# Patient Record
(demographics unavailable — no encounter records)

---

## 2017-02-16 NOTE — CONSULTATION
DATE OF CONSULTATION:  02/16/2017



CARDIOLOGY CONSULTATION



CONSULTING PHYSICIAN:  Steve Colon M.D.



REFERRING PHYSICIAN:  Dc Chen M.D.



REASON FOR CONSULTATION:  Management of chest pain.



HISTORY OF PRESENT ILLNESS:  The patient is a very pleasant

56-year-old female, who presents to the hospital with complaints of

dizziness and left precordial chest pain.  Apparently, the patient was at

home when she started to have an acute onset of chest pain described as

pressure-like, nonradiating, _____, and the intensity of 7/10 with

associated mild shortness of breath and mild nausea.  She called 911 and

was brought to the emergency department where the blood pressure was

131/76 and heart rate was 112.  She denies any prior history of coronary

artery disease, congestive heart failure, or cardiac arrhythmias.

However, she has risk factors of diabetes mellitus and hypertension from

coronary artery disease standpoint.  She denies any change in exercise

tolerance in the past two weeks.



PAST MEDICAL HISTORY:  Diabetes mellitus, hypertension, history of

diabetic neuropathy, history of left breast cancer, status post

lumpectomy, and chemotherapy.



PAST SURGICAL HISTORY:  Lumpectomy and hysterectomy.



MEDICATIONS:  List of medications at home, amlodipine 10 mg p.o.

daily, vitamin D3 2000 units one tablet daily, vitamin B12 1000 mcg p.o.

daily, folic acid 0.4 mg daily, Amaryl 4 mg p.o. twice daily,

hydrochlorothiazide 25 mg p.o. daily, metformin 850 mg p.o. twice daily,

topiramate 100 mg p.o. daily, triamterene hydrochlorothiazide 37.5/25 one

tablet daily, and _____ 25 mg p.o. daily.



SOCIAL HISTORY:  Denies any smoking at this time.  She has smoked in

the past, cessation is about a year.  Denies any alcohol or illicit drug

use.



FAMILY HISTORY:  Both mom and dad had coronary artery disease and

myocardial infarction in 50s.



REVIEW OF SYSTEMS:  HEENT:  Denies any headache, but she had

dizziness and lightheadedness.  Constitutional:  She is complaining of

generalized weakness.  No fever, chills, or night sweats.  Cardiovascular:

Chest pain as mentioned above.  Some mild shortness of breath.  Denies

any PND, orthopnea, leg swelling, palpitations, or syncope.  Pulmonary:

Denies any cough, hemoptysis, or wheezing.  Gastrointestinal:  She has

some nausea, but no diarrhea, constipation, abdominal pain, or GI bleed.

Genitourinary:  Denies any hematuria, dysuria, or incontinence.

Neurology:  Denies any motor dysfunction, sensory deficit, or altered

speech.



PHYSICAL EXAMINATION:

VITAL SIGNS:  Blood pressure was 152/62, pulse of 78, respirations

21, temperature 97.8 degrees Fahrenheit, and O2 saturation 99% on room

air.

GENERAL:  The patient is a very pleasant 56-year-old female in no

apparent respiratory distress.  Alert and oriented x4.

HEENT:  Atraumatic and normocephalic.  Anicteric.  Pupils are equal,

round, and reactive to light and accommodation.  Extraocular muscles

intact.

NECK:  JVP is less than 5 cm.  No carotid bruits.  Carotid upstrokes

2+ bilaterally.

CVS:  Normal S1 and S2.  Regular rate and rhythm.  No murmurs,

gallops, or rubs.  PMI is at fourth intercostal space at the midclavicular

line.

LUNGS:  Clear to auscultation bilaterally.

ABDOMEN:  Soft, nontender, and nondistended.  No hepatosplenomegaly.

Positive bowel sounds.

EXTREMITIES:  No evidence of edema, clubbing, or cyanosis.



LABORATORY AND DIAGNOSTIC FINDINGS:  WBC is 8.0, hemoglobin 13.2,

hematocrit of 42.1, and platelet count 221,000.  Sodium was 132, potassium

3.6, chloride was 91, carbon dioxide 18, BUN of 25, and creatinine 1.6,

glucose is 145, calcium is 10.0, troponin I x3 is negative.  Chest x-ray

showed no acute cardiopulmonary disease.  CT of head showed normal CT of

head without intracranial bleed or shift.  The 2D echocardiography shows

normal LV systolic function with LVEF of about 50% to 55% and grade 1 LV

diastolic dysfunction.  Normal right ventricular systolic pressure

measured at 11 mmHg.  A 12-lead electrocardiogram shows sinus tachycardia

at a rate of 114 with nonspecific ST and T-wave abnormalities.  No acute

ischemic changes.



ASSESSMENT AND PLAN:  The patient is a very pleasant 56-year-old

female, seen in Cardiology consultation at the request of Dr. Chen.



1. Chest pain, probably atypical.  The patient's echocardiography shows

no evidence of wall motion abnormalities.  A 12-lead electrocardiogram is

not revealing any criteria for ischemia.

Acute myocardial infarction is ruled out.  The patient do not require

any further cardiac workup at this point.

2. Renal failure.  I would consider placing a hold on

hydrochlorothiazide in view of hyponatremia, metformin also needs to be

withheld in view of high creatinine.

Nephrology consultation will also be required to adjust the patient's

medication dose and not quite sure whether the patient ______.  Hydration

will be attempted to see if there is any component of acute kidney

injury.

3. Diabetes mellitus.  I will consider aspirin and statin to her

regimen.  The patient might benefit from ACE inhibitors in a long run in

view of associated kidney disease.

4. History of hypertension.  Blood pressure is currently stage 1.  We

will continue amlodipine.  We may have to add ACE or ARB if the acute

kidney injury is ruled out.



I would like to thank, Dr. Chen, for the courtesy of this

consultation.









  ______________________________________________

  Steve Colon M.D.





DR:  ALONZO

D:  02/16/2017 19:18

T:  02/16/2017 22:14

JOB#:  [id number]

CC:

## 2017-02-16 NOTE — HISTORY AND PHYSICAL REPORT
DATE OF ADMISSION:  2017



CHIEF COMPLAINT:  The patient is a 56 year  female,

who presents with a chief complaint of chest pain.



HISTORY OF PRESENT ILLNESS:  Began yesterday, 02/15/2017 around 9

p.m.  The patient began to feel dizzy.  The patient felt ringing in her

ears.  The patient called 911.  The patient stated that her blood sugar

was fine.  EMS left her house.



The patient then began to experience chest pressure.  Chest pressure is

substernal.  It has been constant since last evening.  The patient then

presented to [facility].  The patient was admitted for chest

pain to rule out acute coronary syndrome.



PAST MEDICAL HISTORY:  Significant for,



1. Type 2 diabetes.

2. Hypertension.

3. History of left breast cancer, status post lumpectomy, chemotherapy,

and radiation therapy.

4. Chronic low back pain.



PAST SURGICAL HISTORY:  Significant for,



1. Total abdominal hysterectomy.

2. Left breast lumpectomy.



CURRENT MEDICATIONS:

1. Norvasc 10 mg one tablet p.o. daily.

2. Vitamin D 2000 units daily.

3. Vitamin B12 1000 mcg daily.

4. Folic acid 0.4 mg one tablet p.o. daily.

5. Glimepiride 4 mg one tablet p.o. twice daily.

6. Hydrochlorothiazide 25 mg one tablet p.o. daily.

7. Metformin 850 mg one tablet p.o. twice daily.

8. Topamax 100 mg one tablet p.o. daily.

9. Triamterene/hydrochlorothiazide 37.5/25 one tablet p.o. daily.

10. _____ of an unknown dose daily.



ALLERGIES:  To morphine.



SOCIAL HISTORY:  The patient is single and is disabled.  The patient

is  from her .  The patient denies tobacco or alcohol

use.



FAMILY HISTORY:  Significant for diabetes in the patient's mother and

father.  Significant for coronary artery disease in the patient's parents

and siblings, all of whom  in their 50s.



REVIEW OF SYSTEMS:  Constitutional:  The patient denies weight loss

or weight gain.  The patient denies fevers or chills.  HEENT:  The patient

denies ear or throat pain.  Cardiovascular:  The patient complains of

chest pain as above.  The patient denies palpitations.  Chest:  The

patient denies wheeze or shortness of breath.  Abdomen:  The patient

denies nausea, vomiting, diarrhea, or constipation.  Genitourinary:  The

patient denies dysuria or increased frequency of urination.

Neuromuscular:  The patient denies seizures or generalized weakness.



PHYSICAL EXAM:

VITAL SIGNS:  Temperature 97.5 degrees, respirations 20, pulse 91 to

93, blood pressure 130/90.

GENERAL:  The patient is well-developed and well-nourished obese

 female, in no apparent distress.

HEENT:  Eyes, pupils are equal and responsive to light and

accommodation.  Extraocular movements are intact.

NECK:  Supple without lymphadenopathy.

CHEST:  Lungs are clear to auscultation bilaterally without wheezes

or rales.

CARDIOVASCULAR:  Regular rhythm and rate.  S1 and S2 are normal

without murmurs, rubs, or gallops.

ABDOMEN:  Soft, nontender, and nondistended.  Positive bowel sounds.

No evidence of hepatosplenomegaly.  Currently, no rebound or guarding

noted.

EXTREMITIES:  Negative for clubbing, cyanosis, or edema.

RECTAL:  Refused.

GENITAL:  Refused.

NEUROLOGICAL:  Cranial nerves II through XII are grossly intact

without focal deficits.  Motor strength is 5/5 bilaterally.  Deep tendon

reflexes are 2+ plantar.



LABORATORY AND DIAGNOSTIC STUDIES:  WBC 8.2, hemoglobin 15.2,

hematocrit 42.1, and platelets 221,000.  Sodium 133, potassium 3.6,

chloride 91, CO2 18, BUN 25, creatinine 1.6, and glucose 145.  Troponin is

less than 0.3.  EKG demonstrated normal sinus rhythm with nonspecific ST

changes.  Otherwise, no Q-waves noted.



ASSESSMENT:  This is a 56-year-old  female.



1. Chest pain.

2. Diabetes type 2.

3. Hypertension.

4. History of left breast cancer.

5. Chronic low back pain.



TREATMENT:

1. Chest pain.  A Cardiology consultation is pending.  A Cardiolite

stress test is pending.  We will follow recommendations of Cardiology.

Serial troponin levels will be run.

2. Diabetes type 2.  Continue glimepiride and metformin as above.  A

NovoLog sliding scale has been instituted.

3. Hypertension.  Continue amlodipine as above.

4. History of left breast cancer.  The patient is status post

lumpectomy, chemotherapy, and radiation therapy.

5. Chronic low back pain.









  ______________________________________________

  Catalino Phillips M.D.





DR:  POONAM

D:  2017 17:06

T:  2017 20:16

JOB#:  [id number]

CC:

## 2017-02-16 NOTE — HISTORY & PHYSICAL
History and Physical


History & Physicial


Dictated for Int Med-Dr Chen no. [id number].











ED KLEIN Feb 16, 2017 17:09

## 2017-02-16 NOTE — CONSULTATION
History of Present Illness


General


Date patient seen:  Feb 16, 2017


Chief Complaint:  chest pain and dyspnea


Referring physician:  Dr. Phillips


Reason for Consultation:  dyspnea





Present Illness


HPI


s a 56-year-old female with history of diabetes and hypertension presenting to 

[facility] ER


with c/o chest pain and dyspnea.  The patients hospitalist asked me to consult 

on the case to evaluate the 


patients respiratory status.  At this time preliminary CXR does not reveal an 

acute infiltrate and or effusion, 


the exam may be limiting if the infiltration is in an early stage secondary to 

dehydration.  The patient also has a 


history esophagitis.  She presents with multiple complaints.  She complaining 

of dizziness and lightheadedness 


for the whole day aswell.  Also with coughing congestion.  Now with chest pain 

for last few hours.  Denies any fever or chills.  


Pain is centrally located.  No radiation.  7/10.  No exertional component.  

Also with bilateral lower extremity burning sensation.


.


Allergies:  


Coded Allergies:  


     MORPHINE (Verified  Allergy, Unknown, 2/22/16)





Medication History


Scheduled


Amlodipine Besylate* (Amlodipine Besylate*), 10 MG ORAL DAILY, (Reported)


Cyanocobalamin (Vitamin B-12) (B-12), 1,000 MCG PO DAILY, (Reported)


Folic Acid (Folic Acid), 0.4 MG ORAL DAILY, (Reported)


Glimepiride* (Amaryl*), 4 MG ORAL BID, (Reported)


Hydrochlorothiazide* (Hydrochlorothiazide*), 25 MG ORAL DAILY, (Reported)


Metformin Hcl* (Metformin Hcl*), 850 MG ORAL BID, (Reported)


Topiramate (Trokendi Xr), 100 MG PO DAILY, (Reported)


Triamterene/Hctz (Triamterene-Hctz 37.5-25 mg Cp), 1 CAP ORAL DAILY, (Reported)


Vitamin D (Vitamin D3), 5,000 UNITS ORAL ONCE A WEEK, (Reported)


[jardiance], 25 MG PO DAILY, (Reported)





Miscellaneous Medications


Cholecalciferol (Vitamin D3) (D3 Dots), 5,000 UNIT PO, (Reported)





Patient History


Healthcare decision maker


pt alert and oriented


Resuscitation status


Full Code


Advanced Directive on File








Past Medical/Surgical History


Past Medical/Surgical History:  


(1) UTI (urinary tract infection)


(2) Renal stone


(3) UTI (urinary tract infection)


(4) Back pain


(5) Low back pain


(6) HTN (hypertension)


(7) ACS (acute coronary syndrome)


(8) Diabetes mellitus





Review of Systems


Constitutional:  Reports: malaise, weakness


Cardiovascular:  Reports: chest pain, palpitations


Neurological:  Reports: dizziness





Physical Exam


General Appearance:  no apparent distress


Lines, tubes and drains:  peripheral


HEENT:  normocephalic, atraumatic, anicteric


Neck:  non-tender, normal alignment, supple


Respiratory/Chest:  chest wall non-tender, decreased breath sounds


Cardiovascular/Chest:  normal peripheral pulses, normal rate, regular rhythm


Abdomen:  normal bowel sounds, non tender, soft


Genitourinary/Rectal:  normal genital exam, normal rectal exam


Extremities:  normal range of motion, non-tender, normal inspection


Skin Exam:  normal pigmentation


Neurologic:  CNs II-XII grossly normal, no motor/sensory deficits





Last 24 Hour Vital Signs








  Date Time  Temp Pulse Resp B/P Pulse Ox O2 Delivery O2 Flow Rate FiO2


 


2/16/17 16:00 97.8 78 21 152/62 99 Room Air  


 


2/16/17 12:00 97.0 82 17 128/72 97 Room Air  





  83      


 


2/16/17 12:00  76      


 


2/16/17 10:30  133      


 


2/16/17 10:29  86  135/72    


 


2/16/17 08:00 96.9 86 17 135/72 98 Room Air  





  86      


 


2/16/17 08:00  87      


 


2/16/17 04:00  91      


 


2/16/17 04:00 97.5 93 20 130/90 97 Room Air  


 


2/16/17 02:30 97.9 84 21 105/58 96 Room Air  


 


2/16/17 02:25 97.9 84 21 105/58 96 Room Air  


 


2/16/17 00:30  100 21   Room Air  


 


2/16/17 00:21 97.9 112 23 131/76 100 Room Air  

















Intake and Output  


 


 2/15/17 2/16/17





 19:00 07:00


 


  


 


# Voids  1











Laboratory Tests








Test


  2/16/17


00:33 2/16/17


08:30 2/16/17


16:38


 


White Blood Count


  8.0 K/UL


(4.8-10.8) 


  


 


 


Red Blood Count


  4.55 M/UL


(4.20-5.40) 


  


 


 


Hemoglobin


  15.2 G/DL


(12.0-16.0) 


  


 


 


Hematocrit


  42.1 %


(37.0-47.0) 


  


 


 


Mean Corpuscular Volume 92 FL (80-99)    


 


Mean Corpuscular Hemoglobin


  33.4 PG


(27.0-31.0)  H 


  


 


 


Mean Corpuscular Hemoglobin


Concent 36.1 G/DL


(32.0-36.0)  H 


  


 


 


Red Cell Distribution Width


  11.7 %


(11.6-14.8) 


  


 


 


Platelet Count


  221 K/UL


(150-450) 


  


 


 


Mean Platelet Volume


  8.3 FL


(6.5-10.1) 


  


 


 


Neutrophils (%) (Auto)


  65.5 %


(45.0-75.0) 


  


 


 


Lymphocytes (%) (Auto)


  25.7 %


(20.0-45.0) 


  


 


 


Monocytes (%) (Auto)


  6.4 %


(1.0-10.0) 


  


 


 


Eosinophils (%) (Auto)


  0.3 %


(0.0-3.0) 


  


 


 


Basophils (%) (Auto)


  2.1 %


(0.0-2.0)  H 


  


 


 


Urine Color Pale yellow    


 


Urine Appearance Clear    


 


Urine pH 6.5 (4.5-8.0)    


 


Urine Specific Gravity


  1.005


(1.005-1.035) 


  


 


 


Urine Protein


  Negative


(NEGATIVE) 


  


 


 


Urine Glucose (UA)


  4+ (NEGATIVE)


H 


  


 


 


Urine Ketones


  Negative


(NEGATIVE) 


  


 


 


Urine Occult Blood


  Negative


(NEGATIVE) 


  


 


 


Urine Nitrite


  Negative


(NEGATIVE) 


  


 


 


Urine Bilirubin


  Negative


(NEGATIVE) 


  


 


 


Urine Urobilinogen


  Normal MG/DL


(0.0-1.0) 


  


 


 


Urine Leukocyte Esterase


  Negative


(NEGATIVE) 


  


 


 


Sodium Level


  132 mEQ/L


(135-145)  L 


  


 


 


Potassium Level


  3.6 mEQ/L


(3.4-4.9) 


  


 


 


Chloride Level


  91 mEQ/L


()  L 


  


 


 


Carbon Dioxide Level


  18 mEQ/L


(20-30)  L 


  


 


 


Anion Gap 23 (5-15)  H  


 


Blood Urea Nitrogen


  25 mg/dL


(7-23)  H 


  


 


 


Creatinine


  1.6 mg/dL


(0.5-0.9)  H 


  


 


 


Estimat Glomerular Filtration


Rate 40.4 mL/min


(>60) 


  


 


 


Glucose Level


  145 mg/dL


()  H 


  


 


 


Calcium Level


  10.0 mg/dL


(8.6-10.2) 


  


 


 


Total Bilirubin


  0.3 mg/dL


(0.0-1.2) 


  


 


 


Aspartate Amino Transf


(AST/SGOT) 21 U/L (5-40)  


  


  


 


 


Alanine Aminotransferase


(ALT/SGPT) 19 U/L (3-33)  


  


  


 


 


Alkaline Phosphatase


  86 U/L


() 


  


 


 


Total Creatine Kinase


  99 U/L


() 


  


 


 


Creatine Kinase MB


  1.8 ng/mL (<


3.8) 


  


 


 


Creatine Kinase MB Relative


Index 1.8  


  


  


 


 


Troponin I


  < 0.30 ng/mL


(<=0.30) < 0.30 ng/mL


(<=0.30) < 0.30 ng/mL


(<=0.30)


 


Pro-B-Type Natriuretic Peptide


  29 pg/mL


(0-125) 


  


 


 


Total Protein


  7.9 g/dL


(6.6-8.7) 


  


 


 


Albumin


  5.1 g/dL


(3.5-5.2) 


  


 


 


Globulin 2.8 g/dL    


 


Albumin/Globulin Ratio 1.8 (1.0-2.7)    








Height (Feet):  5


Height (Inches):  1.00


Weight (Pounds):  192


Medications





Current Medications








 Medications


  (Trade)  Dose


 Ordered  Sig/Ankush


 Route


 PRN Reason  Start Time


 Stop Time Status Last Admin


Dose Admin


 


 Acetaminophen


  (Tylenol)  650 mg  Q6H  PRN


 ORAL


 Mild Pain/Temp > 100.5  2/16/17 05:45


 3/18/17 05:44   


 


 


 Acetaminophen/


 Hydrocodone Bitart


  (Norco 5/325)  1 tab  Q6H  PRN


 ORAL


 For Pain  2/16/17 06:45


 2/23/17 06:44   


 


 


 Amlodipine


 Besylate


  (Norvasc)  10 mg  DAILY


 ORAL


   2/16/17 09:00


 3/18/17 08:59  2/16/17 10:29


 


 


 Aspirin


  (ASA)  81 mg  DAILY


 ORAL


   2/16/17 09:00


 3/18/17 08:59  2/16/17 10:29


 


 


 Dextrose


  (Dextrose 50%)    STAT  PRN


 IV


 Hypoglycemia  2/16/17 05:45


 3/18/17 05:44   


 


 


 Folic Acid


  (Folate)  1 mg  DAILY


 ORAL


   2/16/17 09:00


 3/18/17 08:59  2/16/17 10:29


 


 


 Glimepiride


  (Amaryl)  4 mg  BID


 ORAL


   2/16/17 09:00


 3/18/17 08:59  2/16/17 11:26


 


 


 Heparin Sodium


  (Porcine)


  (Heparin 5000


 units/ml)  5,000 units  EVERY 8  HOURS


 SUBQ


   2/16/17 06:00


 3/18/17 05:59  2/16/17 14:52


 


 


 Insulin Aspart


  (NovoLOG)    BEFORE MEALS AND  HS


 SUBQ


   2/16/17 06:30


 3/18/17 06:29  2/16/17 13:03


 


 


 Nitroglycerin


  (Ntg)  0.4 mg  Q5M  PRN


 SL


 Prn Chest Pain  2/16/17 05:45


 3/18/17 05:44   


 


 


 Patient Own


 Medication


  (Patient's Own


 Med)  1 ea  DAILY


 ORAL


   2/16/17 09:00


 3/18/17 08:59  2/16/17 10:29


 


 


 Patient Own


 Medication


  (Patient's Own


 Med)  1 ea  DAILY


 ORAL


   2/16/17 09:00


 3/18/17 08:59  2/16/17 10:29


 











Assessment/Plan


Problem List:  


(1) ACS (acute coronary syndrome)


ICD Codes:  I20.0 - Unstable angina


SNOMED:  491196294


(2) HTN (hypertension)


ICD Codes:  I10 - Essential (primary) hypertension


SNOMED:  38853477


(3) Diabetes mellitus


ICD Codes:  E11.9 - Type 2 diabetes mellitus without complications


SNOMED:  32777652


Qualifiers:  


   


(4) Obesity (BMI 30-39.9)


ICD Codes:  E66.9 - Obesity, unspecified


SNOMED:  147093478, 076726631


(5) Peripheral neuropathy


ICD Codes:  G62.9 - Polyneuropathy, unspecified


SNOMED:  503632530, 052216579


Qualifiers:  


   Qualified Codes:  G63 - Polyneuropathy in diseases classified elsewhere


(6) Dizziness


ICD Codes:  R42 - Dizziness and giddiness


SNOMED:  049093142, 029696509


Status:  stable, progressing


Assessment/Plan


serial ekg, troponin


echo


cardiology evaluation


stress testing











CASEY SHAW Feb 16, 2017 17:31

## 2017-02-16 NOTE — CONSULTATION
DATE OF CONSULTATION:  2017



NEPHROLOGY CONSULTATION



REFERRING PHYSICIAN:  Dc Chen M.D.



REASON FOR CONSULTATION:  Acute renal failure, hyponatremia and

electrolyte imbalance.



HISTORY OF PRESENT ILLNESS:  The patient is a pleasant 66-year-old

 female presented unfortunately with past medical history

of diabetes, hypertension, obesity and history of esophagitis, who

presented to the emergency room complaining of multiple problem.  Her

problem started last night before she goes to bed.  She started having

some severe dizziness and vertigo and lightheadedness.  She started to

have some ringing sensation in her left ear and later she started having

chest pain.  She describes her pain 7/10, was not radiating, was

associated with shortness of breath and nausea.  She consequently came to

emergency room.  In the ER, the patient was diagnosed with acute renal

failure and acute coronary syndrome, was admitted in the hospital.  I was

called for management of renal disease and electrolyte imbalance.



ALLERGIES:  She is allergic to codeine.



PAST MEDICAL HISTORY:

1. Diabetes.

2. Hypertension.

3. Dyslipidemia.

4. Morbid obesity.

5. History of GI bleeding in the past.



MEDICATIONS:

1. Norvasc 10 mg p.o. daily.

2. Vitamin D.

3. Cyanocobalamin 1000 mg by mouth daily.

4. Folic acid one p.o. daily.

5. Amaryl 4 mg p.o. daily.

6. Hydrochlorothiazide 25 mg p.o. daily.

7. Metformin 850 mg p.o. daily.

8. Topiramate 100 mg p.o. daily.

9. Vitamin B 400 mg daily.



SOCIAL HISTORY:  Quit smoking about a year ago.  There is no history

of alcohol or drug use.



FAMILY HISTORY:  She has a strong family history.  Both her mother

and father  at the age of 50 from the heart disease, also her brother.

She has another sister who is on dialysis.



Noncontributory.



REVIEW OF SYSTEMS:  General:  She complained of generalized weakness.

Denies any fever, chills, or night sweats.  Head And Neck:  Denies any

dysphagia, odynophagia, blurry vision, headache, or neck stiffness.

Pulmonary:  No current shortness of breath.  She is still complaining of

cough with no sputum.  Cardiovascular:  Complained of chest pain as

mentioned in history of present illness.  At this point, the patient is

chest pain free.    Gastrointestinal:  Denied any nausea, vomiting,

diarrhea, hematemesis, or hematochezia.  Genitourinary:  Denies any

dysuria, frequency, or hematuria.    Musculoskeletal:  Denies generalized

weakness or numbness.



PHYSICAL EXAMINATION:

VITAL SIGNS:  The patient has temperature of 98.0 degrees, blood

pressure 105/58, pulse rate of 84, and respiratory rate of 18.

HEAD AND NECK:  No JVP.  No LAD.  No thyromegaly.  Extraocular

movement intact.  Pupils are reactive to light and accommodation.

LUNGS:  Clear to auscultation.

CARDIAC:  Regular rate and rhythm.  S1 and S2.  No murmur.  No rub.

ABDOMEN:  Soft, nontender, and nondistended.  No organomegaly.

EXTREMITIES:  Trace edema.  No clubbing.  No cyanosis.

NEUROLOGIC:  Cranial nerves II through XII within normal limits.

Upper and lower extremities are grossly intact.



LABORATORY AND DIAGNOSTIC DATA:  The patient has sodium 132,

potassium 3.6, 91 chloride, 18 bicarbonate, BUN of 25, creatinine of 1.6

and glucose of 145.  Calcium of 10.  AST of 21, ALT of 19 and alkaline

phosphatase of 86.  Albumin of 5.6.  Total protein of 7.9.  CBC revealed

WBC count of 8, hemoglobin of 15, hematocrit of 42, and platelet count of

221,000.  Urinalysis revealed specific gravity of 1.005, pH of 6.5,

glucose 4+, no WBC and no RBC.



ASSESSMENT:

1. Acute renal failure.  The etiology of acute renal failure including

acute tubular necrosis due to unstable _____ hemodynamics versus prerenal

azotemia and dehydration.  The patient was on diuretic at home.

2. Hyponatremia, most likely as a result of hydrochlorothiazide at home.

Also need to rule out diabetic nephropathy.

3. Acute coronary syndrome.

4. Uncontrolled diabetes.

5. Hypertension.

6. Strong family history of cardiovascular disease and kidney disease.

 



PLAN:  To check the random urine protein creatinine ratio to

calculate the proteinuria.  Check the urine eosinophils.  Check the

microalbumin.  Ultrasound of the kidney.  Start the patient on IV fluids

and I would start the patient on normal saline at 70 mL/hour.  I would

avoid any NSAIDs or nephrotoxic.  Replace electrolytes as needed.  _____

IV piggyback with normal saline.  Again, I would like to thank, Dr. Chen,

for allowing me to participate in the care of this patient.









  ______________________________________________

  Jemimamel Lundberg M.D.





DR:  ZAC

D:  2017 10:23

T:  2017 19:06

JOB#:  [id number]

CC:

## 2017-02-16 NOTE — CARDIOLOGY PROGRESS NOTE
Assessment/Plan


Assessment/Plan


The patient is seen and examined, full consult note is dictated.





Objective





Last 24 Hour Vital Signs








  Date Time  Temp Pulse Resp B/P Pulse Ox O2 Delivery O2 Flow Rate FiO2


 


2/16/17 16:00 97.8 78 21 152/62 99 Room Air  


 


2/16/17 12:00 97.0 82 17 128/72 97 Room Air  





  83      


 


2/16/17 12:00  76      


 


2/16/17 10:30  133      


 


2/16/17 10:29  86  135/72    


 


2/16/17 08:00 96.9 86 17 135/72 98 Room Air  





  86      


 


2/16/17 08:00  87      


 


2/16/17 04:00  91      


 


2/16/17 04:00 97.5 93 20 130/90 97 Room Air  


 


2/16/17 02:30 97.9 84 21 105/58 96 Room Air  


 


2/16/17 02:25 97.9 84 21 105/58 96 Room Air  


 


2/16/17 00:30  100 21   Room Air  


 


2/16/17 00:21 97.9 112 23 131/76 100 Room Air  

















Intake and Output  


 


 2/15/17 2/16/17





 19:00 07:00


 


  


 


# Voids  1











Laboratory Tests








Test


  2/16/17


00:33 2/16/17


08:30 2/16/17


16:38


 


White Blood Count


  8.0 K/UL


(4.8-10.8) 


  


 


 


Red Blood Count


  4.55 M/UL


(4.20-5.40) 


  


 


 


Hemoglobin


  15.2 G/DL


(12.0-16.0) 


  


 


 


Hematocrit


  42.1 %


(37.0-47.0) 


  


 


 


Mean Corpuscular Volume 92 FL (80-99)    


 


Mean Corpuscular Hemoglobin


  33.4 PG


(27.0-31.0)  H 


  


 


 


Mean Corpuscular Hemoglobin


Concent 36.1 G/DL


(32.0-36.0)  H 


  


 


 


Red Cell Distribution Width


  11.7 %


(11.6-14.8) 


  


 


 


Platelet Count


  221 K/UL


(150-450) 


  


 


 


Mean Platelet Volume


  8.3 FL


(6.5-10.1) 


  


 


 


Neutrophils (%) (Auto)


  65.5 %


(45.0-75.0) 


  


 


 


Lymphocytes (%) (Auto)


  25.7 %


(20.0-45.0) 


  


 


 


Monocytes (%) (Auto)


  6.4 %


(1.0-10.0) 


  


 


 


Eosinophils (%) (Auto)


  0.3 %


(0.0-3.0) 


  


 


 


Basophils (%) (Auto)


  2.1 %


(0.0-2.0)  H 


  


 


 


Urine Color Pale yellow    


 


Urine Appearance Clear    


 


Urine pH 6.5 (4.5-8.0)    


 


Urine Specific Gravity


  1.005


(1.005-1.035) 


  


 


 


Urine Protein


  Negative


(NEGATIVE) 


  


 


 


Urine Glucose (UA)


  4+ (NEGATIVE)


H 


  


 


 


Urine Ketones


  Negative


(NEGATIVE) 


  


 


 


Urine Occult Blood


  Negative


(NEGATIVE) 


  


 


 


Urine Nitrite


  Negative


(NEGATIVE) 


  


 


 


Urine Bilirubin


  Negative


(NEGATIVE) 


  


 


 


Urine Urobilinogen


  Normal MG/DL


(0.0-1.0) 


  


 


 


Urine Leukocyte Esterase


  Negative


(NEGATIVE) 


  


 


 


Sodium Level


  132 mEQ/L


(135-145)  L 


  


 


 


Potassium Level


  3.6 mEQ/L


(3.4-4.9) 


  


 


 


Chloride Level


  91 mEQ/L


()  L 


  


 


 


Carbon Dioxide Level


  18 mEQ/L


(20-30)  L 


  


 


 


Anion Gap 23 (5-15)  H  


 


Blood Urea Nitrogen


  25 mg/dL


(7-23)  H 


  


 


 


Creatinine


  1.6 mg/dL


(0.5-0.9)  H 


  


 


 


Estimat Glomerular Filtration


Rate 40.4 mL/min


(>60) 


  


 


 


Glucose Level


  145 mg/dL


()  H 


  


 


 


Calcium Level


  10.0 mg/dL


(8.6-10.2) 


  


 


 


Total Bilirubin


  0.3 mg/dL


(0.0-1.2) 


  


 


 


Aspartate Amino Transf


(AST/SGOT) 21 U/L (5-40)  


  


  


 


 


Alanine Aminotransferase


(ALT/SGPT) 19 U/L (3-33)  


  


  


 


 


Alkaline Phosphatase


  86 U/L


() 


  


 


 


Total Creatine Kinase


  99 U/L


() 


  


 


 


Creatine Kinase MB


  1.8 ng/mL (<


3.8) 


  


 


 


Creatine Kinase MB Relative


Index 1.8  


  


  


 


 


Troponin I


  < 0.30 ng/mL


(<=0.30) < 0.30 ng/mL


(<=0.30) < 0.30 ng/mL


(<=0.30)


 


Pro-B-Type Natriuretic Peptide


  29 pg/mL


(0-125) 


  


 


 


Total Protein


  7.9 g/dL


(6.6-8.7) 


  


 


 


Albumin


  5.1 g/dL


(3.5-5.2) 


  


 


 


Globulin 2.8 g/dL    


 


Albumin/Globulin Ratio 1.8 (1.0-2.7)    

















HAMMAD GARAY Feb 16, 2017 19:03

## 2017-02-16 NOTE — DIAGNOSTIC IMAGING REPORT
Indication: Chest pain



Technique: One view of the chest



Comparison: 4/8/2015



Findings: Lungs and pleural spaces are clear. Heart size is normal. There are 

left

axillary surgical clips. Findings are unchanged



Impression: No acute process

## 2017-02-16 NOTE — DIAGNOSTIC IMAGING REPORT
Indication: DIZZY



Technique: Continuous helical CT scanning of the head was performed without

intravenous contrast material. Axial and coronal 5 mm sections were generated.

Radiation dose was minimized using automated exposure control



Dose:

Total Dose Length Product - DLP 1432 mGycm.

Volume CT Dose Index - CTDIvol(s) 70.38 mGy.



Comparison: None



Findings: The ventricular system is normal in size and configuration. There is no

shift of midline structures. No abnormal extra-axial fluid collections are noted.

There is no evidence of intracerebral bleeding. No other abnormal high or low

density areas are noted within the brain.



Impression: Normal CT scan of the head without contrast material.



This agrees with the preliminary interpretation provided overnight by 

[facility]

teleradiology service.









The CT scanner at [facility] is accredited by the [employer] College 

of

Radiology and the scans are performed using protocols designed to limit 

radiation

exposure to as low as reasonably achievable to attain images of sufficient

resolution adequate for diagnostic evaluation.

## 2017-02-16 NOTE — GENERAL PROGRESS NOTE
Progress Note


Progress Note


[id number] full note dictated











FLAKITO WATKINS Feb 16, 2017 10:23

## 2017-02-17 NOTE — CARDIOLOGY REPORT
--------------- APPROVED REPORT --------------





EXAM: Two-dimensional and M-mode echocardiogram with Doppler and color Doppler.



M-Mode DIMENSIONS 

IVSd1.0 (0.7-1.1cm)Left Atrium (MM)3.7 (1.6-4.0cm)

LVDd4.4 (3.5-5.6cm)Aortic Root2.9 (2.0-3.7cm)

PWd1.1 (0.7-1.1cm)Aortic Cusp Exc.1.8 (1.5-2.0cm)



LVDs3.1 (2.5-4.0cm)

PWs1.8 cm





Technically difficult study due to poor acoustic windows.

Normal left ventricular chamber size, systolic function and wall motion.

Left ventricular ejection fraction estimated to be 50-55 %.

No evidence of pericardial fat or effusion.

All other cardiac chamber sizes are within normal limits.

Mild focal aortic valve sclerosis with adequate cusp excursion.

Mildly thickened mitral valve leaflets with normal excursion.

Mild mitral annulus and aortic root calcification.

Pulmonic valve not well visualized.

Normal tricuspid valve structure.

IVC at normal size with physiologic collapse.



A  color flow and spectral Doppler study was performed and revealed:

No aortic regurgitation.

Trace mitral regurgitation.

Mitral diastolic velocities suggest reduced left ventricular relaxation (Grade I).

Trace tricuspid regurgitation.

Tricuspid systolic velocities suggests peak right ventricular systolic pressure of 11 

mmHg. 

No pulmonic regurgitation present.

## 2017-02-17 NOTE — DIAGNOSTIC IMAGING REPORT
Indication: Acute renal failure



Technique: Grayscale and duplex images of the kidneys, retroperitoneum, and bladder

were obtained.



Comparison:



Findings: Right kidney measures 10.7 cm in length. Left kidney measures 10.3 cm 

in

length. Both kidneys demonstrate normal echogenicity. No hydronephrosis.  Echogenic

focus within the left renal sinus is consistent with renal calyceal 

calcification

demonstrated on prior CT scan 8/22/2015. Normal inferior vena cava. Bladder is

normal.



Impression: Small nonobstructive left renal calyceal calcification, also 

previously

described



Otherwise unremarkable. No evidence of hydronephrosis.

## 2017-02-17 NOTE — INTERNAL MED PROGRESS NOTE
Subjective


Date of Service:  Feb 17, 2017


Physician Name


Ed Klein


Attending Physician


Dc Chen MD





Current Medications








 Medications


  (Trade)  Dose


 Ordered  Sig/Ankush


 Route


 PRN Reason  Start Time


 Stop Time Status Last Admin


Dose Admin


 


 Acetaminophen


  (Tylenol)  650 mg  Q6H  PRN


 ORAL


 Mild Pain/Temp > 100.5  2/16/17 05:45


 3/18/17 05:44   


 


 


 Acetaminophen/


 Hydrocodone Bitart


  (Norco 5/325)  1 tab  Q6H  PRN


 ORAL


 For Pain  2/16/17 06:45


 2/23/17 06:44   


 


 


 Amlodipine


 Besylate


  (Norvasc)  10 mg  DAILY


 ORAL


   2/16/17 09:00


 3/18/17 08:59  2/17/17 09:28


 


 


 Aspirin


  (ASA)  81 mg  DAILY


 ORAL


   2/16/17 09:00


 3/18/17 08:59  2/17/17 09:27


 


 


 Dextrose


  (Dextrose 50%)    STAT  PRN


 IV


 Hypoglycemia  2/16/17 05:45


 3/18/17 05:44   


 


 


 Folic Acid


  (Folate)  1 mg  DAILY


 ORAL


   2/16/17 09:00


 3/18/17 08:59  2/17/17 09:27


 


 


 Glimepiride


  (Amaryl)  4 mg  BID


 ORAL


   2/16/17 09:00


 3/18/17 08:59  2/17/17 17:58


 


 


 Heparin Sodium


  (Porcine)


  (Heparin 5000


 units/ml)  5,000 units  EVERY 8  HOURS


 SUBQ


   2/16/17 06:00


 3/18/17 05:59  2/17/17 14:49


 


 


 Insulin Aspart


  (NovoLOG)    BEFORE MEALS AND  HS


 SUBQ


   2/16/17 06:30


 3/18/17 06:29  2/17/17 17:59


 


 


 Nitroglycerin


  (Ntg)  0.4 mg  Q5M  PRN


 SL


 Prn Chest Pain  2/16/17 05:45


 3/18/17 05:44   


 


 


 Patient Own


 Medication


  (Patient's Own


 Med)  1 ea  DAILY


 ORAL


   2/16/17 09:00


 3/18/17 08:59  2/17/17 10:33


 


 


 Patient Own


 Medication


  (Patient's Own


 Med)  1 ea  DAILY


 ORAL


   2/16/17 09:00


 3/18/17 08:59  2/17/17 10:33


 








Allergies:  


Coded Allergies:  


     MORPHINE (Verified  Allergy, Unknown, 2/22/16)


ROS Limited/Unobtainable:  No


Constitutional:  Reports: no symptoms


HEENT:  Reports: no symptoms


Cardiovascular:  Reports: chest pain


Respiratory:  Reports: no symptoms


Gastrointestinal/Abdominal:  Reports: no symptoms


Genitourinary:  Reports: no symptoms


Neurologic/Psychiatric:  Reports: no symptoms


Subjective


57 YO F admitted with chest pain.  Await adenosine stress test results.  Cover 

for Int Travis-Dr Chen





Objective





Last Vital Signs








  Date Time  Temp Pulse Resp B/P Pulse Ox O2 Delivery O2 Flow Rate FiO2


 


2/17/17 16:00 97.5 93 20 136/71 100 Room Air  








General Appearance:  WD/WN, no apparent distress, alert


EENT:  PERRL/EOMI, normal ENT inspection, TMs normal


Neck:  non-tender, normal alignment, supple


Cardiovascular:  normal peripheral pulses, normal rate, regular rhythm, no 

gallop/murmur, no JVD


Respiratory/Chest:  chest wall non-tender, lungs clear, normal breath sounds, 

no respiratory distress, no accessory muscle use


Abdomen:  normal bowel sounds, non tender, soft, no organomegaly, no mass


Extremities:  normal range of motion


Neurologic:  CNs II-XII grossly normal, no motor/sensory deficits


Skin:  normal pigmentation, warm/dry





Laboratory Tests








Test


  2/17/17


05:10 2/17/17


05:20


 


White Blood Count


  4.3 K/UL


(4.8-10.8)  L 


 


 


Red Blood Count


  4.34 M/UL


(4.20-5.40) 


 


 


Hemoglobin


  13.9 G/DL


(12.0-16.0) 


 


 


Hematocrit


  39.8 %


(37.0-47.0) 


 


 


Mean Corpuscular Volume 92 FL (80-99)   


 


Mean Corpuscular Hemoglobin


  31.9 PG


(27.0-31.0)  H 


 


 


Mean Corpuscular Hemoglobin


Concent 34.8 G/DL


(32.0-36.0) 


 


 


Red Cell Distribution Width


  11.8 %


(11.6-14.8) 


 


 


Platelet Count


  173 K/UL


(150-450) 


 


 


Mean Platelet Volume


  9.2 FL


(6.5-10.1) 


 


 


Neutrophils (%) (Auto)


  48.1 %


(45.0-75.0) 


 


 


Lymphocytes (%) (Auto)


  41.5 %


(20.0-45.0) 


 


 


Monocytes (%) (Auto)


  8.5 %


(1.0-10.0) 


 


 


Eosinophils (%) (Auto)


  0.5 %


(0.0-3.0) 


 


 


Basophils (%) (Auto)


  1.4 %


(0.0-2.0) 


 


 


Sodium Level


  137 mEQ/L


(135-145) 


 


 


Potassium Level


  4.0 mEQ/L


(3.4-4.9) 


 


 


Chloride Level


  98 mEQ/L


() 


 


 


Carbon Dioxide Level


  18 mEQ/L


(20-30)  L 


 


 


Anion Gap 21 (5-15)  H 


 


Blood Urea Nitrogen


  21 mg/dL


(7-23) 


 


 


Creatinine


  1.4 mg/dL


(0.5-0.9)  H 


 


 


Estimat Glomerular Filtration


Rate 47.1 mL/min


(>60) 


 


 


Glucose Level


  201 mg/dL


()  H 


 


 


Calcium Level


  9.6 mg/dL


(8.6-10.2) 


 


 


Phosphorus Level


  3.9 mg/dL


(2.5-4.8) 


 


 


Magnesium Level


  1.9 mg/dL


(1.7-2.5) 


 


 


Total Bilirubin


  0.3 mg/dL


(0.0-1.2) 


 


 


Aspartate Amino Transf


(AST/SGOT) 22 U/L (5-40)  


  


 


 


Alanine Aminotransferase


(ALT/SGPT) 19 U/L (3-33)  


  


 


 


Alkaline Phosphatase


  79 U/L


() 


 


 


Troponin I


  < 0.30 ng/mL


(<=0.30) 


 


 


Pro-B-Type Natriuretic Peptide


  47 pg/mL


(0-125) 


 


 


Total Protein


  6.6 g/dL


(6.6-8.7) 


 


 


Albumin


  4.4 g/dL


(3.5-5.2) 


 


 


Globulin 2.2 g/dL   


 


Albumin/Globulin Ratio 2.0 (1.0-2.7)   


 


Triglycerides Level


  390 mg/dL (<


150)  H 


 


 


Cholesterol Level


  223 mg/dL (<


200)  H 


 


 


LDL Cholesterol


  111 mg/dL


(60-99)  H 


 


 


HDL Cholesterol


  34 mg/dL (>


60) 


 


 


Cholesterol/HDL Ratio


  6.6 (3.3-4.4)


H 


 


 


Urine Eosinophils  None seen  


 


Urine Random Creatinine  Pending  


 


Urine Random Microalbumin  Pending  


 


Urine Random Total Protein  14 mg/dL  


 


Urine Random Sodium  54 mmol/L  


 


Urine Creatinine  146.7 mg/dL  


 


Urine Microalbumin/Creatinine


Ratio 


  Pending  


 

















Intake and Output  


 


 2/16/17 2/17/17





 19:00 07:00


 


Intake Total 650 ml 


 


Output Total 1000 ml 1000 ml


 


Balance -350 ml -1000 ml


 


  


 


Intake Oral 650 ml 


 


Output Urine Total 1000 ml 1000 ml


 


# Voids 4 











Assessment/Plan


Problem List:  


(1) Breast cancer


Assessment & Plan:  S/P lumpectomy and chemo/radiation therapy





(2) Chest pain


Assessment & Plan:  Await cardiolite stress test.  See cardiology note.





(3) HTN (hypertension)


(4) Diabetes mellitus


Assessment & Plan:  cont novolog and amaryl.





(5) Obesity (BMI 30-39.9)


Status:  not improved











DE KLEIN Feb 17, 2017 18:49

## 2017-02-17 NOTE — CARDIOLOGY PROGRESS NOTE
Assessment/Plan


Assessment/Plan


1.  Probably non-cardiac chest pain, adenosine cardiolite test result is still 

pending.


2.  Renal failure, creat down to 1.4.


3. Diabetes mellitus, continue aspirin and statin.


4. History of hypertension, continue amlodipine.


5. Dyslipidemia





Subjective


Subjective


Sinus rhythm at 77.


Still complains about chest pain in the left precordial area.





Objective





Last 24 Hour Vital Signs








  Date Time  Temp Pulse Resp B/P Pulse Ox O2 Delivery O2 Flow Rate FiO2


 


2/17/17 20:00  84      


 


2/17/17 20:00 97.3 77 21 120/53 96 Room Air  


 


2/17/17 16:00  75      


 


2/17/17 16:00 97.5 93 20 136/71 100 Room Air  


 


2/17/17 12:00 97.4 87 18 122/80 98   





  89      


 


2/17/17 12:00  85      


 


2/17/17 09:28  90  125/72    


 


2/17/17 08:00 97.0 90 18 120/72 98 Room Air  





  92      


 


2/17/17 08:00  80      


 


2/17/17 04:00 97.0 86 20 131/62 98 Room Air  





  0      


 


2/17/17 04:00  88      


 


2/17/17 00:00 98.0 58 20 133/62 98 Room Air  


 


2/17/17 00:00  74      

















Intake and Output  


 


 2/16/17 2/17/17





 19:00 07:00


 


Intake Total 650 ml 


 


Output Total 1000 ml 1000 ml


 


Balance -350 ml -1000 ml


 


  


 


Intake Oral 650 ml 


 


Output Urine Total 1000 ml 1000 ml


 


# Voids 4 








2D Echo:  LVEF 55-60%, RVSP 11 mmHg, Grade I lVDD





Laboratory Tests








Test


  2/17/17


05:10 2/17/17


05:20


 


White Blood Count


  4.3 K/UL


(4.8-10.8)  L 


 


 


Red Blood Count


  4.34 M/UL


(4.20-5.40) 


 


 


Hemoglobin


  13.9 G/DL


(12.0-16.0) 


 


 


Hematocrit


  39.8 %


(37.0-47.0) 


 


 


Mean Corpuscular Volume 92 FL (80-99)   


 


Mean Corpuscular Hemoglobin


  31.9 PG


(27.0-31.0)  H 


 


 


Mean Corpuscular Hemoglobin


Concent 34.8 G/DL


(32.0-36.0) 


 


 


Red Cell Distribution Width


  11.8 %


(11.6-14.8) 


 


 


Platelet Count


  173 K/UL


(150-450) 


 


 


Mean Platelet Volume


  9.2 FL


(6.5-10.1) 


 


 


Neutrophils (%) (Auto)


  48.1 %


(45.0-75.0) 


 


 


Lymphocytes (%) (Auto)


  41.5 %


(20.0-45.0) 


 


 


Monocytes (%) (Auto)


  8.5 %


(1.0-10.0) 


 


 


Eosinophils (%) (Auto)


  0.5 %


(0.0-3.0) 


 


 


Basophils (%) (Auto)


  1.4 %


(0.0-2.0) 


 


 


Sodium Level


  137 mEQ/L


(135-145) 


 


 


Potassium Level


  4.0 mEQ/L


(3.4-4.9) 


 


 


Chloride Level


  98 mEQ/L


() 


 


 


Carbon Dioxide Level


  18 mEQ/L


(20-30)  L 


 


 


Anion Gap 21 (5-15)  H 


 


Blood Urea Nitrogen


  21 mg/dL


(7-23) 


 


 


Creatinine


  1.4 mg/dL


(0.5-0.9)  H 


 


 


Estimat Glomerular Filtration


Rate 47.1 mL/min


(>60) 


 


 


Glucose Level


  201 mg/dL


()  H 


 


 


Calcium Level


  9.6 mg/dL


(8.6-10.2) 


 


 


Phosphorus Level


  3.9 mg/dL


(2.5-4.8) 


 


 


Magnesium Level


  1.9 mg/dL


(1.7-2.5) 


 


 


Total Bilirubin


  0.3 mg/dL


(0.0-1.2) 


 


 


Aspartate Amino Transf


(AST/SGOT) 22 U/L (5-40)  


  


 


 


Alanine Aminotransferase


(ALT/SGPT) 19 U/L (3-33)  


  


 


 


Alkaline Phosphatase


  79 U/L


() 


 


 


Troponin I


  < 0.30 ng/mL


(<=0.30) 


 


 


Pro-B-Type Natriuretic Peptide


  47 pg/mL


(0-125) 


 


 


Total Protein


  6.6 g/dL


(6.6-8.7) 


 


 


Albumin


  4.4 g/dL


(3.5-5.2) 


 


 


Globulin 2.2 g/dL   


 


Albumin/Globulin Ratio 2.0 (1.0-2.7)   


 


Triglycerides Level


  390 mg/dL (<


150)  H 


 


 


Cholesterol Level


  223 mg/dL (<


200)  H 


 


 


LDL Cholesterol


  111 mg/dL


(60-99)  H 


 


 


HDL Cholesterol


  34 mg/dL (>


60) 


 


 


Cholesterol/HDL Ratio


  6.6 (3.3-4.4)


H 


 


 


Urine Eosinophils  None seen  


 


Urine Random Creatinine  Pending  


 


Urine Random Microalbumin  Pending  


 


Urine Random Total Protein  14 mg/dL  


 


Urine Random Sodium  54 mmol/L  


 


Urine Creatinine  146.7 mg/dL  


 


Urine Microalbumin/Creatinine


Ratio 


  Pending  


 








Objective


HEENT:  Atraumatic and normocephalic.  Anicteric.  Pupils are equal,


round, and reactive to light and accommodation.  Extraocular muscles


intact.


NECK:  JVP is less than 5 cm.  No carotid bruits.  Carotid upstrokes


2+ bilaterally.


CVS:  Normal S1 and S2.  Regular rate and rhythm.  No murmurs,


gallops, or rubs.  PMI is at fourth intercostal space at the midclavicular


line.


LUNGS:  Clear to auscultation bilaterally.


ABDOMEN:  Soft, nontender, and nondistended.  No hepatosplenomegaly.


Positive bowel sounds.


EXTREMITIES:  No evidence of edema, clubbing, or cyanosis.











HAMMAD GARAY Feb 17, 2017 23:52

## 2017-02-17 NOTE — NEPHROLOGY PROGRESS NOTE
Assessment/Plan


Assessment


1.ARF Improving 


2.hyponatremia hypovolemic 


3.HTN well controlled 


4.DM


5ACS


Plan


PLAN 


to continue ivf 


monitoring renal function avoid NSAID 


Replace electrolyte as need it





Subjective


Constitutional:  Reports: malaise, weakness


HEENT:  Reports: no symptoms


Genitourinary:  Reports: no symptoms


Neurologic/Psychiatric:  Reports: no symptoms


Subjective


alert and awake feeling better





Objective


Objective





Last 24 Hour Vital Signs








  Date Time  Temp Pulse Resp B/P Pulse Ox O2 Delivery O2 Flow Rate FiO2


 


2/17/17 12:00 97.4 87 18 122/80 98   





  89      


 


2/17/17 09:28  90  125/72    


 


2/17/17 08:00 97.0 90 18 120/72 98 Room Air  





  92      


 


2/17/17 08:00  80      


 


2/17/17 04:00 97.0 86 20 131/62 98 Room Air  





  0      


 


2/17/17 04:00  88      


 


2/17/17 00:00 98.0 58 20 133/62 98 Room Air  


 


2/17/17 00:00  74      


 


2/16/17 20:00 97.6 75 20 122/63 99 Room Air  


 


2/16/17 20:00  70      


 


2/16/17 16:00 97.8 78 21 152/62 99 Room Air  

















Intake and Output  


 


 2/16/17 2/17/17





 19:00 07:00


 


Intake Total 650 ml 


 


Output Total 1000 ml 1000 ml


 


Balance -350 ml -1000 ml


 


  


 


Intake Oral 650 ml 


 


Output Urine Total 1000 ml 1000 ml


 


# Voids 4 








Laboratory Tests


2/16/17 16:38: Troponin I < 0.30


2/17/17 05:10: 


Troponin I < 0.30, White Blood Count 4.3L, Red Blood Count 4.34, Hemoglobin 13.9

, Hematocrit 39.8, Mean Corpuscular Volume 92, Mean Corpuscular Hemoglobin 31.9H

, Mean Corpuscular Hemoglobin Concent 34.8, Red Cell Distribution Width 11.8, 

Platelet Count 173, Mean Platelet Volume 9.2, Neutrophils (%) (Auto) 48.1, 

Lymphocytes (%) (Auto) 41.5, Monocytes (%) (Auto) 8.5, Eosinophils (%) (Auto) 

0.5, Basophils (%) (Auto) 1.4, Sodium Level 137, Potassium Level 4.0, Chloride 

Level 98, Carbon Dioxide Level 18L, Anion Gap 21H, Blood Urea Nitrogen 21, 

Creatinine 1.4H, Estimat Glomerular Filtration Rate 47.1, Glucose Level 201H, 

Calcium Level 9.6, Phosphorus Level 3.9, Magnesium Level 1.9, Total Bilirubin 

0.3, Aspartate Amino Transf (AST/SGOT) 22, Alanine Aminotransferase (ALT/SGPT) 

19, Alkaline Phosphatase 79, Pro-B-Type Natriuretic Peptide 47, Total Protein 

6.6, Albumin 4.4, Globulin 2.2, Albumin/Globulin Ratio 2.0, Triglycerides Level 

390H, Cholesterol Level 223H, LDL Cholesterol 111H, HDL Cholesterol 34, 

Cholesterol/HDL Ratio 6.6H


2/17/17 05:20: 


Urine Eosinophils None seen, Urine Random Creatinine [Pending], Urine Random 

Microalbumin [Pending], Urine Random Total Protein 14, Urine Random Sodium 54, 

Urine Creatinine 146.7, Urine Microalbumin/Creatinine Ratio [Pending]


Height (Feet):  5


Height (Inches):  1.00


Weight (Pounds):  192


Objective


HEAD AND NECK:  No JVP.  No LAD.  No thyromegaly.  Extraocular


movement intact.  Pupils are reactive to light and accommodation.


LUNGS:  Clear to auscultation.


CARDIAC:  Regular rate and rhythm.  S1 and S2.  No murmur.  No rub.


ABDOMEN:  Soft, nontender, and nondistended.  No organomegaly.


EXTREMITIES:  Trace edema.  No clubbing.  No cyanosis.


NEUROLOGIC:  Cranial nerves II through XII within normal limits.


Upper and lower extremities are grossly intact.











FLAKITO WATKINS Feb 17, 2017 14:17

## 2017-02-17 NOTE — PULMONOLOGY PROGRESS NOTE
Assessment/Plan


Problems:  


(1) ACS (acute coronary syndrome)


(2) Acute renal insufficiency


(3) Peripheral neuropathy


(4) HTN (hypertension)


(5) Diabetes mellitus


(6) Obesity (BMI 30-39.9)


Assessment/Plan


f/u electrolytes


stress test done, no results available 


renal function improving





dc home if stress test negative.





Subjective


ROS Limited/Unobtainable:  No


Interval Events:  feeling much better


Allergies:  


Coded Allergies:  


     MORPHINE (Verified  Allergy, Unknown, 2/22/16)





Objective





Last 24 Hour Vital Signs








  Date Time  Temp Pulse Resp B/P Pulse Ox O2 Delivery O2 Flow Rate FiO2


 


2/17/17 12:00 97.4 87 18 122/80 98   





  89      


 


2/17/17 12:00  85      


 


2/17/17 09:28  90  125/72    


 


2/17/17 08:00 97.0 90 18 120/72 98 Room Air  





  92      


 


2/17/17 08:00  80      


 


2/17/17 04:00 97.0 86 20 131/62 98 Room Air  





  0      


 


2/17/17 04:00  88      


 


2/17/17 00:00 98.0 58 20 133/62 98 Room Air  


 


2/17/17 00:00  74      


 


2/16/17 20:00 97.6 75 20 122/63 99 Room Air  


 


2/16/17 20:00  70      

















Intake and Output  


 


 2/16/17 2/17/17





 19:00 07:00


 


Intake Total 650 ml 


 


Output Total 1000 ml 1000 ml


 


Balance -350 ml -1000 ml


 


  


 


Intake Oral 650 ml 


 


Output Urine Total 1000 ml 1000 ml


 


# Voids 4 








General Appearance:  WD/WN


HEENT:  normocephalic, atraumatic


Respiratory/Chest:  chest wall non-tender, lungs clear


Breasts:  no masses


Cardiovascular:  normal peripheral pulses


Abdomen:  normal bowel sounds, soft, non tender


Skin:  no rash


Neurologic/Psychiatric:  CNs II-XII grossly normal


Lymphatic:  no neck adenopathy


Laboratory Tests


2/17/17 05:10: 


White Blood Count 4.3L, Red Blood Count 4.34, Hemoglobin 13.9, Hematocrit 39.8, 

Mean Corpuscular Volume 92, Mean Corpuscular Hemoglobin 31.9H, Mean Corpuscular 

Hemoglobin Concent 34.8, Red Cell Distribution Width 11.8, Platelet Count 173, 

Mean Platelet Volume 9.2, Neutrophils (%) (Auto) 48.1, Lymphocytes (%) (Auto) 

41.5, Monocytes (%) (Auto) 8.5, Eosinophils (%) (Auto) 0.5, Basophils (%) (Auto

) 1.4, Sodium Level 137, Potassium Level 4.0, Chloride Level 98, Carbon Dioxide 

Level 18L, Anion Gap 21H, Blood Urea Nitrogen 21, Creatinine 1.4H, Estimat 

Glomerular Filtration Rate 47.1, Glucose Level 201H, Calcium Level 9.6, 

Phosphorus Level 3.9, Magnesium Level 1.9, Total Bilirubin 0.3, Aspartate Amino 

Transf (AST/SGOT) 22, Alanine Aminotransferase (ALT/SGPT) 19, Alkaline 

Phosphatase 79, Troponin I < 0.30, Pro-B-Type Natriuretic Peptide 47, Total 

Protein 6.6, Albumin 4.4, Globulin 2.2, Albumin/Globulin Ratio 2.0, 

Triglycerides Level 390H, Cholesterol Level 223H, LDL Cholesterol 111H, HDL 

Cholesterol 34, Cholesterol/HDL Ratio 6.6H


2/17/17 05:20: 


Urine Eosinophils None seen, Urine Random Creatinine [Pending], Urine Random 

Microalbumin [Pending], Urine Random Total Protein 14, Urine Random Sodium 54, 

Urine Creatinine 146.7, Urine Microalbumin/Creatinine Ratio [Pending]





Current Medications








 Medications


  (Trade)  Dose


 Ordered  Sig/Ankush


 Route


 PRN Reason  Start Time


 Stop Time Status Last Admin


Dose Admin


 


 Acetaminophen


  (Tylenol)  650 mg  Q6H  PRN


 ORAL


 Mild Pain/Temp > 100.5  2/16/17 05:45


 3/18/17 05:44   


 


 


 Acetaminophen/


 Hydrocodone Bitart


  (Norco 5/325)  1 tab  Q6H  PRN


 ORAL


 For Pain  2/16/17 06:45


 2/23/17 06:44   


 


 


 Amlodipine


 Besylate


  (Norvasc)  10 mg  DAILY


 ORAL


   2/16/17 09:00


 3/18/17 08:59  2/17/17 09:28


 


 


 Aspirin


  (ASA)  81 mg  DAILY


 ORAL


   2/16/17 09:00


 3/18/17 08:59  2/17/17 09:27


 


 


 Dextrose


  (Dextrose 50%)    STAT  PRN


 IV


 Hypoglycemia  2/16/17 05:45


 3/18/17 05:44   


 


 


 Folic Acid


  (Folate)  1 mg  DAILY


 ORAL


   2/16/17 09:00


 3/18/17 08:59  2/17/17 09:27


 


 


 Glimepiride


  (Amaryl)  4 mg  BID


 ORAL


   2/16/17 09:00


 3/18/17 08:59  2/17/17 09:28


 


 


 Heparin Sodium


  (Porcine)


  (Heparin 5000


 units/ml)  5,000 units  EVERY 8  HOURS


 SUBQ


   2/16/17 06:00


 3/18/17 05:59  2/17/17 14:49


 


 


 Insulin Aspart


  (NovoLOG)    BEFORE MEALS AND  HS


 SUBQ


   2/16/17 06:30


 3/18/17 06:29  2/17/17 07:04


 


 


 Nitroglycerin


  (Ntg)  0.4 mg  Q5M  PRN


 SL


 Prn Chest Pain  2/16/17 05:45


 3/18/17 05:44   


 


 


 Patient Own


 Medication


  (Patient's Own


 Med)  1 ea  DAILY


 ORAL


   2/16/17 09:00


 3/18/17 08:59  2/17/17 10:33


 


 


 Patient Own


 Medication


  (Patient's Own


 Med)  1 ea  DAILY


 ORAL


   2/16/17 09:00


 3/18/17 08:59  2/17/17 10:33


 

















CASEY SHAW Feb 17, 2017 16:49

## 2017-02-18 NOTE — NEPHROLOGY PROGRESS NOTE
Assessment/Plan


Assessment


1.ARF Improving 


2.hyponatremia hypovolemic 


3.HTN well controlled 


4.DM


5. ckd stage 3


Plan


PLAN 


to continue ivf 


monitoring renal function avoid NSAID 


Replace electrolyte as need it





Subjective


Constitutional:  Reports: no symptoms


HEENT:  Reports: no symptoms


Genitourinary:  Reports: no symptoms


Neurologic/Psychiatric:  Reports: no symptoms


Subjective


alert and awake ok no cp or sob





Objective


Objective





Last 24 Hour Vital Signs








  Date Time  Temp Pulse Resp B/P Pulse Ox O2 Delivery O2 Flow Rate FiO2


 


2/18/17 12:00 96.7 74 17 148/73 99 Room Air  





  80      


 


2/18/17 12:00  69      


 


2/18/17 09:08  75  119/79    


 


2/18/17 08:00  72      


 


2/18/17 08:00 96.9 75 18 119/79 100 Room Air  





  75      


 


2/18/17 04:00  130      


 


2/18/17 04:00 97.7 71 19 126/70 100 Room Air  


 


2/18/17 00:00  64      


 


2/18/17 00:00 97.7 74 19 107/68 100 Room Air  


 


2/17/17 20:00  84      


 


2/17/17 20:00 97.3 77 21 120/53 96 Room Air  

















Intake and Output  


 


 2/17/17 2/18/17





 19:00 07:00


 


Intake Total  500 ml


 


Balance  500 ml


 


  


 


Intake Oral  500 ml


 


# Voids 2 








Laboratory Tests


2/18/17 07:45: 


White Blood Count 5.1, Red Blood Count 4.58, Hemoglobin 14.5, Hematocrit 41.9, 

Mean Corpuscular Volume 92, Mean Corpuscular Hemoglobin 31.6H, Mean Corpuscular 

Hemoglobin Concent 34.6, Red Cell Distribution Width 11.7, Platelet Count 187, 

Mean Platelet Volume 9.5, Neutrophils (%) (Auto) 50.7, Lymphocytes (%) (Auto) 

39.4, Monocytes (%) (Auto) 7.6, Eosinophils (%) (Auto) 0.6, Basophils (%) (Auto

) 1.7, Sodium Level 139, Potassium Level 3.7, Chloride Level 101, Carbon 

Dioxide Level 19L, Anion Gap 19H, Blood Urea Nitrogen 20, Creatinine 1.4H, 

Estimat Glomerular Filtration Rate 47.1, Glucose Level 164H, Calcium Level 9.8


Height (Feet):  5


Height (Inches):  1.00


Weight (Pounds):  192


Objective


HEAD AND NECK:  No JVP.  No LAD.  No thyromegaly.  Extraocular


movement intact.  Pupils are reactive to light and accommodation.


LUNGS:  Clear to auscultation.


CARDIAC:  Regular rate and rhythm.  S1 and S2.  No murmur.  No rub.


ABDOMEN:  Soft, nontender, and nondistended.  No organomegaly.


EXTREMITIES:  Trace edema.  No clubbing.  No cyanosis.


NEUROLOGIC:  Cranial nerves II through XII within normal limits.


Upper and lower extremities are grossly intact.











FLAKITO WATKINS Feb 18, 2017 16:15

## 2017-02-18 NOTE — CARDIOLOGY PROGRESS NOTE
Assessment/Plan


Assessment/Plan


1.  Probably non-cardiac chest pain, adenosine cardiolite test was non-ischemic.


2.  Renal failure, creat stable at 1.4.


3. Diabetes mellitus, continue aspirin and statin.


4. History of hypertension, continue amlodipine.


5. Dyslipidemia





Subjective


Subjective


Sinus rhythm at 85.


Non-ischemic stress test.





Objective





Last 24 Hour Vital Signs








  Date Time  Temp Pulse Resp B/P Pulse Ox O2 Delivery O2 Flow Rate FiO2


 


2/18/17 20:00  85 18 126/87 100 Room Air  


 


2/18/17 16:00  76 18 141/71 99 Room Air  


 


2/18/17 16:00  65      


 


2/18/17 12:00 96.7 74 17 148/73 99 Room Air  





  80      


 


2/18/17 12:00  69      


 


2/18/17 09:08  75  119/79    


 


2/18/17 08:00  72      


 


2/18/17 08:00 96.9 75 18 119/79 100 Room Air  





  75      


 


2/18/17 04:00  130      


 


2/18/17 04:00 97.7 71 19 126/70 100 Room Air  


 


2/18/17 00:00  64      


 


2/18/17 00:00 97.7 74 19 107/68 100 Room Air  

















Intake and Output  


 


 2/17/17 2/18/17





 19:00 07:00


 


Intake Total  500 ml


 


Balance  500 ml


 


  


 


Intake Oral  500 ml


 


# Voids 2 








2D Echo:  LVEF 55-60%, RVSP 11 mmHg, Grade I lVDD





Laboratory Tests








Test


  2/18/17


07:45


 


White Blood Count


  5.1 K/UL


(4.8-10.8)


 


Red Blood Count


  4.58 M/UL


(4.20-5.40)


 


Hemoglobin


  14.5 G/DL


(12.0-16.0)


 


Hematocrit


  41.9 %


(37.0-47.0)


 


Mean Corpuscular Volume 92 FL (80-99)  


 


Mean Corpuscular Hemoglobin


  31.6 PG


(27.0-31.0)  H


 


Mean Corpuscular Hemoglobin


Concent 34.6 G/DL


(32.0-36.0)


 


Red Cell Distribution Width


  11.7 %


(11.6-14.8)


 


Platelet Count


  187 K/UL


(150-450)


 


Mean Platelet Volume


  9.5 FL


(6.5-10.1)


 


Neutrophils (%) (Auto)


  50.7 %


(45.0-75.0)


 


Lymphocytes (%) (Auto)


  39.4 %


(20.0-45.0)


 


Monocytes (%) (Auto)


  7.6 %


(1.0-10.0)


 


Eosinophils (%) (Auto)


  0.6 %


(0.0-3.0)


 


Basophils (%) (Auto)


  1.7 %


(0.0-2.0)


 


Sodium Level


  139 mEQ/L


(135-145)


 


Potassium Level


  3.7 mEQ/L


(3.4-4.9)


 


Chloride Level


  101 mEQ/L


()


 


Carbon Dioxide Level


  19 mEQ/L


(20-30)  L


 


Anion Gap 19 (5-15)  H


 


Blood Urea Nitrogen


  20 mg/dL


(7-23)


 


Creatinine


  1.4 mg/dL


(0.5-0.9)  H


 


Estimat Glomerular Filtration


Rate 47.1 mL/min


(>60)


 


Glucose Level


  164 mg/dL


()  H


 


Calcium Level


  9.8 mg/dL


(8.6-10.2)








Objective


HEENT:  Atraumatic and normocephalic.  Anicteric.  Pupils are equal,


round, and reactive to light and accommodation.  Extraocular muscles


intact.


NECK:  JVP is less than 5 cm.  No carotid bruits.  Carotid upstrokes


2+ bilaterally.


CVS:  Normal S1 and S2.  Regular rate and rhythm.  No murmurs,


gallops, or rubs.  PMI is at fourth intercostal space at the midclavicular


line.


LUNGS:  Clear to auscultation bilaterally.


ABDOMEN:  Soft, nontender, and nondistended.  No hepatosplenomegaly.


Positive bowel sounds.


EXTREMITIES:  No evidence of edema, clubbing, or cyanosis.











HAMMAD GARAY Feb 18, 2017 21:03

## 2017-02-18 NOTE — PHYSICIAN QUERY
*****PLEASE COMPLETE THE DOCUMENT BEFORE SIGNING*****

Dear Dr. Chen                            Date 2/18/2017

/CDS' Name: Tanya Khan/MARTY_     /CDS Phone#: [phone]

Exercise your independent professional judgment when responding to query.  
Questions asked do not imply particular answer is desired or expected.  We 
greatly appreciate your clarification on this issue.

Clinical Documentation States:

"Chest Pain" -- documented in H&P

"Probably non-cardiac chest pain" - in cardiology progress notes 2/17/17



Clinical Findings Show: 

 Troponin = </= 0.30 ng/mL            

 ECHO = 50-55%

 Stress Test = NEGATIVE   



Medication:

Nitroglycerine 0.4 mg SL prn for chest pain

 mg shifted to 81 mg OD

________________________________________________________________________________
_

Please document the suspected etiology of Chest Pain:

a.Type:      []Cardiac      []Non-cardiac        []Unspecified



b.Etiology - cardiac    

   [] Aortic dissection                    []Mitral valve prolapsed      

   [] Acute myocardial infarction               []Spasm of coronary arteries   

   [] Coronary Artery Disease                    []Pericarditis   

c.Etiology - non-cardiac

   [] Anxiety                         []Pleurisy

   [] Cancer                          []Pneumonia, type _____________ 

   [x] Costochondritis              []Pneumothorax      

   [] GERD/Esophagitis          []Pulmonary embolism   

   [] Unable to determine   

   []Other:___________________



Condition Present on Admission:       [x] Yes                      [] No       
      []Clinically Undeterminable



Please also document in your Progress Notes and/or Discharge Summary and 
indicate if the condition was present on admission.



_______________________                ___________________

MATT CHEN M.D.      Date & Time
[facility]

## 2017-02-18 NOTE — DIAGNOSTIC IMAGING REPORT
Indication:  chest pain



Technique: The study was conducted under the supervision of a cardiologist.

Adenosine infusion followed by intravenous administration of 30.1 mCi of technetium

99m Myoview was performed. Three plane SPECT imaging of the heart was then

performed. A resting study was performed as part of the one-day protocol with 10.5

mCi of technetium 99m myoview injected intravenously at that time. Three plane 

SPECT

imaging of the heart was obtained.



Comparison: None



Clinical data:



1. Clinical response:  Non ischemic

2. Electrocardiographic response: Non ischemic



Findings:



The myocardial perfusion scan demonstrates no definite fixed or perfusion defects.

LVEF is 58%.



Impression:



Negative myocardial perfusion scan

## 2017-02-18 NOTE — PULMONOLOGY PROGRESS NOTE
Assessment/Plan


Assessment/Plan


ASSESSMENT


chest pain, likely non cardiac( as per cardio)  - stress test negative, ECHO 

with EF 50-55%, RVSP of 11, cardio follows 


r/o for ACS (acute coronary syndrome)- troponin x 4 negative, ECG SR, no ST 

changes, thus ruled out for acute MI 


acute renal insufficiency/ATN - labs for today pending, creat down to 1.4, 

renal US with normal bilateral kidmey echogenicity, no hydro


Peripheral neuropathy


HTN (hypertension)- BP management with CCB and optimize as needed 


Diabetes mellitus- BS management with oral Amaryl and SS of insulin as needed  


Dyslipidemia - lipid panel with elevated TG, TC and LDL, on statin, continue 


Obesity (BMI 30-39.9)  





DVT prophylaxis 


CT head negative 


CXR negative 


 


can be dc from pulmonary standpoint, stress test negative, fup with PMD 


 dc plan as per PMD  








case discussed and evaluated by supervising physician





Subjective


Allergies:  


Coded Allergies:  


     MORPHINE (Verified  Allergy, Unknown, 2/22/16)


Subjective


denies chest pain, SOB, palpitations





Objective





Last 24 Hour Vital Signs








  Date Time  Temp Pulse Resp B/P Pulse Ox O2 Delivery O2 Flow Rate FiO2


 


2/18/17 09:08  75  119/79    


 


2/18/17 04:00  130      


 


2/18/17 04:00 97.7 71 19 126/70 100 Room Air  


 


2/18/17 00:00  64      


 


2/18/17 00:00 97.7 74 19 107/68 100 Room Air  


 


2/17/17 20:00  84      


 


2/17/17 20:00 97.3 77 21 120/53 96 Room Air  


 


2/17/17 16:00  75      


 


2/17/17 16:00 97.5 93 20 136/71 100 Room Air  


 


2/17/17 12:00 97.4 87 18 122/80 98   





  89      


 


2/17/17 12:00  85      


 


2/17/17 09:28  90  125/72    

















Intake and Output  


 


 2/17/17 2/18/17





 19:00 07:00


 


Intake Total  500 ml


 


Balance  500 ml


 


  


 


Intake Oral  500 ml


 


# Voids 2 








General Appearance:  WD/WN, no acute distress


HEENT:  normocephalic, atraumatic, anicteric, mucous membranes moist, PERRL


Respiratory/Chest:  chest wall non-tender, lungs clear, normal breath sounds, 

no respiratory distress, no accessory muscle use


Cardiovascular:  normal peripheral pulses, normal rate, regular rhythm - SR on 

tele , no JVD


Abdomen:  normal bowel sounds, soft, non tender, non distended


Genitourinary:  normal external genitalia


Extremities:  no edema, pedal pulses normal


Neurologic/Psychiatric:  CNs II-XII grossly normal, no motor/sensory deficits, 

alert, oriented x 3, responsive


Musculoskeletal:  normal muscle bulk


Laboratory Tests


2/18/17 07:45: 


White Blood Count 5.1, Red Blood Count 4.58, Hemoglobin 14.5, Hematocrit 41.9, 

Mean Corpuscular Volume 92, Mean Corpuscular Hemoglobin 31.6H, Mean Corpuscular 

Hemoglobin Concent 34.6, Red Cell Distribution Width 11.7, Platelet Count 187, 

Mean Platelet Volume 9.5, Neutrophils (%) (Auto) 50.7, Lymphocytes (%) (Auto) 

39.4, Monocytes (%) (Auto) 7.6, Eosinophils (%) (Auto) 0.6, Basophils (%) (Auto

) 1.7, Sodium Level [Pending], Potassium Level [Pending], Chloride Level [

Pending], Carbon Dioxide Level [Pending], Blood Urea Nitrogen [Pending], 

Creatinine [Pending], Estimat Glomerular Filtration Rate [Pending], Glucose 

Level [Pending], Calcium Level [Pending]





Current Medications








 Medications


  (Trade)  Dose


 Ordered  Sig/Ankush


 Route


 PRN Reason  Start Time


 Stop Time Status Last Admin


Dose Admin


 


 Acetaminophen


  (Tylenol)  650 mg  Q6H  PRN


 ORAL


 Mild Pain/Temp > 100.5  2/16/17 05:45


 3/18/17 05:44   


 


 


 Acetaminophen/


 Hydrocodone Bitart


  (Norco 5/325)  1 tab  Q6H  PRN


 ORAL


 For Pain  2/16/17 06:45


 2/23/17 06:44   


 


 


 Amlodipine


 Besylate


  (Norvasc)  10 mg  DAILY


 ORAL


   2/16/17 09:00


 3/18/17 08:59  2/18/17 09:08


 


 


 Aspirin


  (ASA)  81 mg  DAILY


 ORAL


   2/16/17 09:00


 3/18/17 08:59  2/18/17 09:07


 


 


 Dextrose


  (Dextrose 50%)    STAT  PRN


 IV


 Hypoglycemia  2/16/17 05:45


 3/18/17 05:44   


 


 


 Folic Acid


  (Folate)  1 mg  DAILY


 ORAL


   2/16/17 09:00


 3/18/17 08:59  2/18/17 09:07


 


 


 Glimepiride


  (Amaryl)  4 mg  BID


 ORAL


   2/16/17 09:00


 3/18/17 08:59  2/18/17 09:07


 


 


 Heparin Sodium


  (Porcine)


  (Heparin 5000


 units/ml)  5,000 units  EVERY 8  HOURS


 SUBQ


   2/16/17 06:00


 3/18/17 05:59  2/18/17 05:40


 


 


 Insulin Aspart


  (NovoLOG)    BEFORE MEALS AND  HS


 SUBQ


   2/16/17 06:30


 3/18/17 06:29  2/18/17 05:41


 


 


 Nitroglycerin


  (Ntg)  0.4 mg  Q5M  PRN


 SL


 Prn Chest Pain  2/16/17 05:45


 3/18/17 05:44   


 


 


 Patient Own


 Medication


  (Patient's Own


 Med)  1 ea  DAILY


 ORAL


   2/16/17 09:00


 3/18/17 08:59  2/17/17 10:33


 


 


 Patient Own


 Medication


  (Patient's Own


 Med)  1 ea  DAILY


 ORAL


   2/16/17 09:00


 3/18/17 08:59  2/18/17 09:07


 


 


 Promethazine HCl/


 Codeine


  (Phenergan with


 Codeine)  5 ml  Q4H  PRN


 ORAL


 For Cough  2/17/17 21:45


 3/19/17 21:44  2/18/17 09:07


 

















Elpidio ([facility])Victoria NP Feb 18, 2017 09:31

## 2017-02-18 NOTE — INTERNAL MED PROGRESS NOTE
Subjective


Date of Service:  Feb 18, 2017


Physician Name


Ed Klein


Attending Physician


Dc Chen MD





Current Medications








 Medications


  (Trade)  Dose


 Ordered  Sig/Ankush


 Route


 PRN Reason  Start Time


 Stop Time Status Last Admin


Dose Admin


 


 Acetaminophen


  (Tylenol)  650 mg  Q6H  PRN


 ORAL


 Mild Pain/Temp > 100.5  2/16/17 05:45


 3/18/17 05:44   


 


 


 Acetaminophen/


 Hydrocodone Bitart


  (Norco 5/325)  1 tab  Q6H  PRN


 ORAL


 For Pain  2/16/17 06:45


 2/23/17 06:44   


 


 


 Amlodipine


 Besylate


  (Norvasc)  10 mg  DAILY


 ORAL


   2/16/17 09:00


 3/18/17 08:59  2/18/17 09:08


 


 


 Aspirin


  (ASA)  81 mg  DAILY


 ORAL


   2/16/17 09:00


 3/18/17 08:59  2/18/17 09:07


 


 


 Dextrose


  (Dextrose 50%)    STAT  PRN


 IV


 Hypoglycemia  2/16/17 05:45


 3/18/17 05:44   


 


 


 Folic Acid


  (Folate)  1 mg  DAILY


 ORAL


   2/16/17 09:00


 3/18/17 08:59  2/18/17 09:07


 


 


 Glimepiride


  (Amaryl)  4 mg  BID


 ORAL


   2/16/17 09:00


 3/18/17 08:59  2/18/17 09:07


 


 


 Heparin Sodium


  (Porcine)


  (Heparin 5000


 units/ml)  5,000 units  EVERY 8  HOURS


 SUBQ


   2/16/17 06:00


 3/18/17 05:59  2/18/17 13:00


 


 


 Insulin Aspart


  (NovoLOG)    BEFORE MEALS AND  HS


 SUBQ


   2/16/17 06:30


 3/18/17 06:29  2/18/17 12:00


 


 


 Nitroglycerin


  (Ntg)  0.4 mg  Q5M  PRN


 SL


 Prn Chest Pain  2/16/17 05:45


 3/18/17 05:44   


 


 


 Patient Own


 Medication


  (Patient's Own


 Med)  1 ea  DAILY


 ORAL


   2/16/17 09:00


 3/18/17 08:59  2/17/17 10:33


 


 


 Patient Own


 Medication


  (Patient's Own


 Med)  1 ea  DAILY


 ORAL


   2/16/17 09:00


 3/18/17 08:59  2/18/17 09:07


 


 


 Promethazine HCl/


 Codeine


  (Phenergan with


 Codeine)  5 ml  Q4H  PRN


 ORAL


 For Cough  2/17/17 21:45


 3/19/17 21:44  2/18/17 09:07


 








Allergies:  


Coded Allergies:  


     MORPHINE (Verified  Allergy, Unknown, 2/22/16)


Subjective


55 YO F admitted with chest pain.  Cover for Int [facility]-Dr Chen





Objective





Last Vital Signs








  Date Time  Temp Pulse Resp B/P Pulse Ox O2 Delivery O2 Flow Rate FiO2


 


2/18/17 12:00  69      


 


2/18/17 09:08    119/79    


 


2/18/17 08:00 96.9  18  100 Room Air  











Laboratory Tests








Test


  2/18/17


07:45


 


White Blood Count


  5.1 K/UL


(4.8-10.8)


 


Red Blood Count


  4.58 M/UL


(4.20-5.40)


 


Hemoglobin


  14.5 G/DL


(12.0-16.0)


 


Hematocrit


  41.9 %


(37.0-47.0)


 


Mean Corpuscular Volume 92 FL (80-99)  


 


Mean Corpuscular Hemoglobin


  31.6 PG


(27.0-31.0)  H


 


Mean Corpuscular Hemoglobin


Concent 34.6 G/DL


(32.0-36.0)


 


Red Cell Distribution Width


  11.7 %


(11.6-14.8)


 


Platelet Count


  187 K/UL


(150-450)


 


Mean Platelet Volume


  9.5 FL


(6.5-10.1)


 


Neutrophils (%) (Auto)


  50.7 %


(45.0-75.0)


 


Lymphocytes (%) (Auto)


  39.4 %


(20.0-45.0)


 


Monocytes (%) (Auto)


  7.6 %


(1.0-10.0)


 


Eosinophils (%) (Auto)


  0.6 %


(0.0-3.0)


 


Basophils (%) (Auto)


  1.7 %


(0.0-2.0)


 


Sodium Level


  139 mEQ/L


(135-145)


 


Potassium Level


  3.7 mEQ/L


(3.4-4.9)


 


Chloride Level


  101 mEQ/L


()


 


Carbon Dioxide Level


  19 mEQ/L


(20-30)  L


 


Anion Gap 19 (5-15)  H


 


Blood Urea Nitrogen


  20 mg/dL


(7-23)


 


Creatinine


  1.4 mg/dL


(0.5-0.9)  H


 


Estimat Glomerular Filtration


Rate 47.1 mL/min


(>60)


 


Glucose Level


  164 mg/dL


()  H


 


Calcium Level


  9.8 mg/dL


(8.6-10.2)

















Intake and Output  


 


 2/17/17 2/18/17





 19:00 07:00


 


Intake Total  500 ml


 


Balance  500 ml


 


  


 


Intake Oral  500 ml


 


# Voids 2 








Objective


General Appearance:  WD/WN, no apparent distress, alert


EENT:  PERRL/EOMI, normal ENT inspection, TMs normal


Neck:  non-tender, normal alignment, supple


Cardiovascular:  normal peripheral pulses, normal rate, regular rhythm, no 

gallop/murmur, no JVD


Respiratory/Chest:  chest wall non-tender, lungs clear, normal breath sounds, 

no respiratory distress, no accessory muscle use


Abdomen:  normal bowel sounds, non tender, soft, no organomegaly, no mass


Extremities:  normal range of motion


Neurologic:  CNs II-XII grossly normal, no motor/sensory deficits


Skin:  normal pigmentation, warm/dry





Assessment/Plan


Problem List:  


(1) Breast cancer


Assessment & Plan:  S/P lumpectomy and chemo/radiation therapy





(2) Chest pain


Assessment & Plan:  Myocardial perfusion scan negative.  See cardiology note.





(3) HTN (hypertension)


Assessment & Plan:  Cont norvasc.





(4) Diabetes mellitus


Assessment & Plan:  cont novolog and amaryl.





(5) Obesity (BMI 30-39.9)


Status:  progressing


Assessment/Plan


Discharge planning:  In home health services vs home health











ED KLEIN Feb 18, 2017 13:14

## 2017-02-19 NOTE — PULMONOLOGY PROGRESS NOTE
Assessment/Plan


Assessment/Plan


ASSESSMENT


chest pain, likely non cardiac( as per cardio)  - stress test negative, ECHO 

with EF 50-55%, RVSP of 11, cardio follows 


r/o for ACS (acute coronary syndrome)- troponin x 4 negative, ECG SR, no ST 

changes, thus ruled out for acute MI 


acute renal insufficiency/ATN - labs for today pending, creat down to 1.4, 

renal US with normal bilateral kidmey echogenicity, no hydro


Peripheral neuropathy


HTN (hypertension)- BP management with CCB and optimize as needed 


Diabetes mellitus- BS management with oral Amaryl and SS of insulin as needed  


Dyslipidemia - lipid panel with elevated TG, TC and LDL, on statin, continue 


Obesity (BMI 30-39.9)  





DVT prophylaxis 


CT head negative 


CXR negative 


 


can be dc from pulmonary standpoint, stress test negative, fup with PMD 


 dc plan as per PMD  today ?








case discussed and evaluated by supervising physician





Subjective


Allergies:  


Coded Allergies:  


     MORPHINE (Verified  Allergy, Unknown, 2/22/16)


Subjective


denies chest pain, SOB, palpitations   


stress test nonischemic





Objective





Last 24 Hour Vital Signs








  Date Time  Temp Pulse Resp B/P Pulse Ox O2 Delivery O2 Flow Rate FiO2


 


2/19/17 12:00 97.5 71 20 141/84 100 Room Air  


 


2/19/17 08:43  93  131/74    


 


2/19/17 08:00 97.7 93 20 131/74 100 Room Air  


 


2/19/17 04:00 96.4 71 20 132/72 100 Room Air  


 


2/19/17 00:00 97.0 60 20 137/73 100 Room Air  


 


2/18/17 20:00  85 18 126/87 100 Room Air  


 


2/18/17 16:00  76 18 141/71 99 Room Air  


 


2/18/17 16:00  65      

















Intake and Output  


 


 2/18/17 2/19/17





 19:00 07:00


 


Intake Total 680 ml 300 ml


 


Output Total 860 ml 


 


Balance -180 ml 300 ml


 


  


 


Intake Oral 680 ml 300 ml


 


Output Urine Total 860 ml 


 


# Voids 3 2








Objective


General Appearance:  WD/WN, no acute distress


HEENT:  normocephalic, atraumatic, anicteric, mucous membranes moist, PERRL


Respiratory/Chest:  chest wall non-tender, lungs clear, normal breath sounds, 

no respiratory distress, no accessory muscle use


Cardiovascular:  normal peripheral pulses, normal rate, regular rhythm - SR on 

tele , no JVD


Abdomen:  normal bowel sounds, soft, non tender, non distended


Genitourinary:  normal external genitalia


Extremities:  no edema, pedal pulses normal


Neurologic/Psychiatric:  CNs II-XII grossly normal, no motor/sensory deficits, 

alert, oriented x 3, responsive


Musculoskeletal:  normal muscle bulk


Laboratory Tests


2/19/17 07:00: 


White Blood Count 4.4L, Red Blood Count 4.64, Hemoglobin 14.4, Hematocrit 43.4, 

Mean Corpuscular Volume 94, Mean Corpuscular Hemoglobin 31.0, Mean Corpuscular 

Hemoglobin Concent 33.1, Red Cell Distribution Width 12.3, Platelet Count 132L, 

Mean Platelet Volume 9.0, Neutrophils (%) (Auto) 56.8, Lymphocytes (%) (Auto) 

34.3, Monocytes (%) (Auto) 6.7, Eosinophils (%) (Auto) 0.7, Basophils (%) (Auto

) 1.5, Sodium Level 139, Potassium Level 4.0, Chloride Level 101, Carbon 

Dioxide Level 19L, Anion Gap 19H, Blood Urea Nitrogen 24H, Creatinine 1.3H, 

Estimat Glomerular Filtration Rate 51.4, Glucose Level 189H, Calcium Level 9.5





Current Medications








 Medications


  (Trade)  Dose


 Ordered  Sig/Ankush


 Route


 PRN Reason  Start Time


 Stop Time Status Last Admin


Dose Admin


 


 Acetaminophen


  (Tylenol)  650 mg  Q6H  PRN


 ORAL


 Mild Pain/Temp > 100.5  2/18/17 23:45


 3/20/17 23:44   


 


 


 Acetaminophen/


 Hydrocodone Bitart


  (Norco 5/325)  1 tab  Q6H  PRN


 ORAL


 For Pain  2/19/17 00:45


 2/26/17 00:44  2/19/17 12:25


 


 


 Amlodipine


 Besylate


  (Norvasc)  10 mg  DAILY


 ORAL


   2/19/17 09:00


 3/21/17 08:59  2/19/17 08:43


 


 


 Aspirin


  (ASA)  81 mg  DAILY


 ORAL


   2/19/17 09:00


 3/21/17 08:59  2/19/17 08:42


 


 


 Dextrose


  (Dextrose 50%)    STAT  PRN


 IV


 Hypoglycemia  2/19/17 05:45


 3/21/17 05:44   


 


 


 Folic Acid


  (Folate)  1 mg  DAILY


 ORAL


   2/19/17 09:00


 3/21/17 08:59  2/19/17 08:42


 


 


 Glimepiride


  (Amaryl)  4 mg  BID


 ORAL


   2/19/17 09:00


 3/21/17 08:59  2/19/17 08:42


 


 


 Heparin Sodium


  (Porcine)


  (Heparin 5000


 units/ml)  5,000 units  EVERY 8  HOURS


 SUBQ


   2/19/17 06:00


 3/21/17 05:59  2/19/17 06:36


 


 


 Insulin Aspart


  (NovoLOG)    BEFORE MEALS AND  HS


 SUBQ


   2/19/17 06:30


 3/21/17 06:29  2/19/17 12:27


 


 


 Nitroglycerin


  (Ntg)  0.4 mg  Q5M  PRN


 SL


 Prn Chest Pain  2/18/17 23:15


 3/20/17 23:14   


 


 


 Patient Own


 Medication


  (Patient's Own


 Med)  1 ea  DAILY


 ORAL


   2/19/17 09:00


 3/21/17 08:59   


 


 


 Patient Own


 Medication


  (Patient's Own


 Med)  1 ea  DAILY


 ORAL


   2/19/17 09:00


 3/21/17 08:59   


 


 


 Promethazine HCl/


 Codeine


  (Phenergan with


 Codeine)  5 ml  Q4H  PRN


 ORAL


 For Cough  2/19/17 01:45


 3/21/17 01:44  2/19/17 12:27


 

















Elpidio ([facility])Victoria NP Feb 19, 2017 14:25

## 2017-02-19 NOTE — INTERNAL MED PROGRESS NOTE
Subjective


Date of Service:  Feb 19, 2017


Physician Name


JacquelineEd


Attending Physician


Dc Chen MD





Current Medications








 Medications


  (Trade)  Dose


 Ordered  Sig/Ankush


 Route


 PRN Reason  Start Time


 Stop Time Status Last Admin


Dose Admin


 


 Acetaminophen


  (Tylenol)  650 mg  Q6H  PRN


 ORAL


 Mild Pain/Temp > 100.5  2/18/17 23:45


 3/20/17 23:44   


 


 


 Acetaminophen/


 Hydrocodone Bitart


  (Norco 5/325)  1 tab  Q6H  PRN


 ORAL


 For Pain  2/19/17 00:45


 2/26/17 00:44  2/19/17 12:25


 


 


 Amlodipine


 Besylate


  (Norvasc)  10 mg  DAILY


 ORAL


   2/19/17 09:00


 3/21/17 08:59  2/19/17 08:43


 


 


 Aspirin


  (ASA)  81 mg  DAILY


 ORAL


   2/19/17 09:00


 3/21/17 08:59  2/19/17 08:42


 


 


 Dextrose


  (Dextrose 50%)    STAT  PRN


 IV


 Hypoglycemia  2/19/17 05:45


 3/21/17 05:44   


 


 


 Folic Acid


  (Folate)  1 mg  DAILY


 ORAL


   2/19/17 09:00


 3/21/17 08:59  2/19/17 08:42


 


 


 Glimepiride


  (Amaryl)  4 mg  BID


 ORAL


   2/19/17 09:00


 3/21/17 08:59  2/19/17 08:42


 


 


 Heparin Sodium


  (Porcine)


  (Heparin 5000


 units/ml)  5,000 units  EVERY 8  HOURS


 SUBQ


   2/19/17 06:00


 3/21/17 05:59  2/19/17 06:36


 


 


 Insulin Aspart


  (NovoLOG)    BEFORE MEALS AND  HS


 SUBQ


   2/19/17 06:30


 3/21/17 06:29  2/19/17 12:27


 


 


 Nitroglycerin


  (Ntg)  0.4 mg  Q5M  PRN


 SL


 Prn Chest Pain  2/18/17 23:15


 3/20/17 23:14   


 


 


 Patient Own


 Medication


  (Patient's Own


 Med)  1 ea  DAILY


 ORAL


   2/19/17 09:00


 3/21/17 08:59   


 


 


 Patient Own


 Medication


  (Patient's Own


 Med)  1 ea  DAILY


 ORAL


   2/19/17 09:00


 3/21/17 08:59   


 


 


 Promethazine HCl/


 Codeine


  (Phenergan with


 Codeine)  5 ml  Q4H  PRN


 ORAL


 For Cough  2/19/17 01:45


 3/21/17 01:44  2/19/17 12:27


 








Allergies:  


Coded Allergies:  


     MORPHINE (Verified  Allergy, Unknown, 2/22/16)


ROS Limited/Unobtainable:  No


Constitutional:  Reports: no symptoms


HEENT:  Reports: no symptoms


Cardiovascular:  Reports: chest pain


Respiratory:  Reports: no symptoms


Gastrointestinal/Abdominal:  Reports: no symptoms


Genitourinary:  Reports: no symptoms


Neurologic/Psychiatric:  Reports: no symptoms


Subjective


57 YO F admitted with chest pain.  Cover for Int Med-Dr Chen





Objective





Last Vital Signs








  Date Time  Temp Pulse Resp B/P Pulse Ox O2 Delivery O2 Flow Rate FiO2


 


2/19/17 12:00 97.5 71 20 141/84 100 Room Air  











Laboratory Tests








Test


  2/19/17


07:00


 


White Blood Count


  4.4 K/UL


(4.8-10.8)  L


 


Red Blood Count


  4.64 M/UL


(4.20-5.40)


 


Hemoglobin


  14.4 G/DL


(12.0-16.0)


 


Hematocrit


  43.4 %


(37.0-47.0)


 


Mean Corpuscular Volume 94 FL (80-99)  


 


Mean Corpuscular Hemoglobin


  31.0 PG


(27.0-31.0)


 


Mean Corpuscular Hemoglobin


Concent 33.1 G/DL


(32.0-36.0)


 


Red Cell Distribution Width


  12.3 %


(11.6-14.8)


 


Platelet Count


  132 K/UL


(150-450)  L


 


Mean Platelet Volume


  9.0 FL


(6.5-10.1)


 


Neutrophils (%) (Auto)


  56.8 %


(45.0-75.0)


 


Lymphocytes (%) (Auto)


  34.3 %


(20.0-45.0)


 


Monocytes (%) (Auto)


  6.7 %


(1.0-10.0)


 


Eosinophils (%) (Auto)


  0.7 %


(0.0-3.0)


 


Basophils (%) (Auto)


  1.5 %


(0.0-2.0)


 


Sodium Level


  139 mEQ/L


(135-145)


 


Potassium Level


  4.0 mEQ/L


(3.4-4.9)


 


Chloride Level


  101 mEQ/L


()


 


Carbon Dioxide Level


  19 mEQ/L


(20-30)  L


 


Anion Gap 19 (5-15)  H


 


Blood Urea Nitrogen


  24 mg/dL


(7-23)  H


 


Creatinine


  1.3 mg/dL


(0.5-0.9)  H


 


Estimat Glomerular Filtration


Rate 51.4 mL/min


(>60)


 


Glucose Level


  189 mg/dL


()  H


 


Calcium Level


  9.5 mg/dL


(8.6-10.2)

















Intake and Output  


 


 2/18/17 2/19/17





 19:00 07:00


 


Intake Total 680 ml 300 ml


 


Output Total 860 ml 


 


Balance -180 ml 300 ml


 


  


 


Intake Oral 680 ml 300 ml


 


Output Urine Total 860 ml 


 


# Voids 3 2








Objective


General Appearance:  WD/WN, no apparent distress, alert


EENT:  PERRL/EOMI, normal ENT inspection, TMs normal


Neck:  non-tender, normal alignment, supple


Cardiovascular:  normal peripheral pulses, normal rate, regular rhythm, no 

gallop/murmur, no JVD


Respiratory/Chest:  chest wall non-tender, lungs clear, normal breath sounds, 

no respiratory distress, no accessory muscle use


Abdomen:  normal bowel sounds, non tender, soft, no organomegaly, no mass


Extremities:  normal range of motion


Neurologic:  CNs II-XII grossly normal, no motor/sensory deficits


Skin:  normal pigmentation, warm/dry





Assessment/Plan


Problem List:  


(1) Breast cancer


Assessment & Plan:  S/P lumpectomy and chemo/radiation therapy





(2) Chest pain


Assessment & Plan:  Myocardial perfusion scan negative.  See cardiology note.





(3) HTN (hypertension)


Assessment & Plan:  Cont norvasc.





(4) Diabetes mellitus


Assessment & Plan:  cont novolog and amaryl.





(5) Obesity (BMI 30-39.9)


Assessment/Plan


Discharge planning:  In home health services vs home health











ED KLEIN Feb 19, 2017 14:25

## 2017-02-20 NOTE — NEPHROLOGY PROGRESS NOTE
Assessment/Plan


Assessment


1.ARF Improving 


2.hyponatremia hypovolemic 


3.HTN well controlled 


4.DM


5. ckd stage 3


Plan


PLAN 


to continue ivf 


monitoring renal function avoid NSAID 


Replace electrolyte as need it





Subjective


Constitutional:  Reports: no symptoms


HEENT:  Reports: no symptoms


Genitourinary:  Reports: no symptoms


Neurologic/Psychiatric:  Reports: no symptoms


Subjective


alert and awake ok no cp or sob





Objective


Objective





Last 24 Hour Vital Signs








  Date Time  Temp Pulse Resp B/P Pulse Ox O2 Delivery O2 Flow Rate FiO2


 


2/20/17 08:47  90  155/75    


 


2/20/17 08:00 97.7 90 20 155/75 100 Room Air  


 


2/20/17 04:00 97.2 64 20 144/74 100 Room Air  


 


2/20/17 00:00 96.3 56 20 125/63 99 Room Air  


 


2/19/17 22:02 97.9       


 


2/19/17 20:00 97.9 72 19 161/72 100 Room Air  


 


2/19/17 16:30 97.2 95 16 149/82 99 Room Air  


 


2/19/17 12:00 97.5 71 20 141/84 100 Room Air  

















Intake and Output  


 


 2/19/17 2/20/17





 19:00 07:00


 


Intake Total 630 ml 300 ml


 


Balance 630 ml 300 ml


 


  


 


Intake Oral 630 ml 300 ml


 


# Voids 4 5


 


# Bowel Movements  1








Laboratory Tests


2/20/17 06:55: 


White Blood Count 4.8, Red Blood Count 4.49, Hemoglobin 14.1, Hematocrit 41.6, 

Mean Corpuscular Volume 93, Mean Corpuscular Hemoglobin 31.5H, Mean Corpuscular 

Hemoglobin Concent 33.9, Red Cell Distribution Width 12.3, Platelet Count 202#, 

Mean Platelet Volume 8.3, Neutrophils (%) (Auto) 52.5, Lymphocytes (%) (Auto) 

36.2, Monocytes (%) (Auto) 8.4, Eosinophils (%) (Auto) 1.3, Basophils (%) (Auto

) 1.7, Sodium Level 139, Potassium Level 4.2, Chloride Level 101, Carbon 

Dioxide Level 21, Anion Gap 17H, Blood Urea Nitrogen 23, Creatinine 1.3H, 

Estimat Glomerular Filtration Rate 51.4, Glucose Level 187H, Calcium Level 9.7


Height (Feet):  5


Height (Inches):  1.00


Weight (Pounds):  192


Objective


HEAD AND NECK:  No JVP.  No LAD.  No thyromegaly.  Extraocular


movement intact.  Pupils are reactive to light and accommodation.


LUNGS:  Clear to auscultation.


CARDIAC:  Regular rate and rhythm.  S1 and S2.  No murmur.  No rub.


ABDOMEN:  Soft, nontender, and nondistended.  No organomegaly.


EXTREMITIES:  Trace edema.  No clubbing.  No cyanosis.


NEUROLOGIC:  Cranial nerves II through XII within normal limits.


Upper and lower extremities are grossly intact.











FLAKITO WATKINS Feb 20, 2017 09:52

## 2017-02-20 NOTE — DISCHARGE SUMMARY
DATE OF ADMISSION:  02/16/2017



DATE OF DISCHARGE:  02/20/2017



ADMITTING DIAGNOSES:

1. Chest pain.

2. Diabetes type 2.

3. Hypertension.

4. Left breast cancer.

5. Chronic low back pain.



DISCHARGE DIAGNOSES:

1. Chest pain.

2. Diabetes type 2.

3. Hypertension.

4. Left breast cancer.

5. Chronic low back pain.



HOSPITAL COURSE BY PROBLEM LIST:

1. Chest pain.  A Cardiology consultation was obtained with Dr. Steve Colon.  The patient had an echocardiogram, which revealed an ejection

fraction 58%.  The patient also underwent a myocardial perfusion scan,

which was reported as negative.  The patient to follow up with her primary

care physician in one week.  Chest pain is not secondary to acute coronary

syndrome.

2. Diabetes type 2.  The patient remained on NovoLog sliding scale

during the hospitalization.  Blood pressure is well controlled in the

hospitalization.  The patient to follow up with her primary care physician

in one week.

3. Hypertension.  The patient remained on Norvasc 10 mg one tablet p.o.

daily.  Blood pressure is well controlled in the hospitalization.  The

patient to follow up with her primary care physician in one week.

4. History of left breast cancer.  The patient is status post left

breast lumpectomy and chemotherapy and radiation therapy.

5. Chronic low back pain.



DISCHARGE MEDICATIONS:  Please refer discharge medication list.



DISCHARGE INSTRUCTIONS:

1. The patient is discharged home today, 02/20/2017.

2. The patient is to follow up with her primary care physician in one

week.









  ______________________________________________

  Catalino Phillips M.D.





DR:  TUCKER

D:  02/20/2017 11:39

T:  02/20/2017 17:33

JOB#:  [id number]

CC:

## 2017-07-11 NOTE — EMERGENCY ROOM REPORT
History of Present Illness


General


Chief Complaint:  Skin Rash/Abscess


Source:  Patient





Present Illness


Allergies:  


Coded Allergies:  


     MORPHINE (Verified  Allergy, Unknown, 2/22/16)





Patient History


Last Menstrual Period:  "partial hyst"





Nursing Documentation-PMH


Hx Cardiac Problems:  Yes


Hx Hypertension:  Yes


Hx Diabetes:  Yes


Hx Cancer:  Yes - Left breast ("Cancer free")


Hx Gastrointestinal Problems:  No


Hx Neurological Problems:  No





Physical Exam





Vital Signs








  Date Time  Temp Pulse Resp B/P Pulse Ox O2 Delivery O2 Flow Rate FiO2


 


7/11/17 09:04 98.4 83 16 148/85 98 Room Air  











Medical Decision Making


Diagnostic Impression:  


 Primary Impression:  


 Hives


Status:  improved


Disposition:  HOME, SELF-CARE


Condition:  Improved


Referrals:  


MARYJANE ESPARZA (PCP)











Jose A Combs M.D. Jul 11, 2017 10:48

## 2017-07-24 NOTE — EMERGENCY ROOM REPORT
History of Present Illness


General


Chief Complaint:  Skin Rash/Abscess





Present Illness


HPI


56 Yo Female presents to the ED c/O 8/10 in severity Burning and itching rash 

that consists of multiple insect bites, bilateral that was, hands and wrists x2 

weeks.  Patient states she was seen by dermatologist who prescribed antifungal 

topical and cortisone cream 6 days ago.  Patient states she has not had any 

relief with use of these medications.  Patient denies fevers or chills.  

Patient states she continues to have itching and burning pain in areas where 

lesions are.  Patient states she has bites all over her body.  Denies new 

medications, denies blisters, or sloughing of skin. Patient denies recent 

travel or ill contacts patient denies abdominal pain, nausea or vomiting. 

denies swelling of the lips, tongue or airway, denies SOB, wheezing or 

difficulty breathing. Denies CP, Palpitations, LOC, AMS, dizziness, Changes in 

Vision, Sensation, paresthesias, or a sudden severe headache.


Allergies:  


Coded Allergies:  


     MORPHINE (Verified  Allergy, Unknown, 2/22/16)





Patient History


Past Medical History:  see triage record, DM


Past Surgical History:  none


Pertinent Family History:  none


Pregnant Now:  No


Reviewed Nursing Documentation:  PMH: Agreed, PSxH: Agreed





Nursing Documentation-PMH


Hx Cardiac Problems:  Yes


Hx Hypertension:  Yes


Hx Diabetes:  Yes


Hx Cancer:  Yes - Left breast ("Cancer free")


Hx Gastrointestinal Problems:  No


Hx Neurological Problems:  No





Review of Systems


All Other Systems:  negative except mentioned in HPI





Physical Exam





Vital Signs








  Date Time  Temp Pulse Resp B/P Pulse Ox O2 Delivery O2 Flow Rate FiO2


 


7/24/17 18:17 98.4 86 20 122/67 100 Room Air  








Sp02 EP Interpretation:  reviewed, normal


General Appearance:  well appearing, no apparent distress, alert, GCS 15, non-

toxic


Head:  normocephalic, atraumatic


Eyes:  bilateral eye PERRL, bilateral eye normal inspection


ENT:  hearing grossly normal, normal pharynx, no angioedema, normal voice, 

moist mucus membranes, other - no oral lesions noted


Neck:  full range of motion, supple/symm/no masses


Respiratory:  lungs clear, normal breath sounds, no wheezing, speaking full 

sentences


Cardiovascular #1:  regular rate, rhythm, no edema


Musculoskeletal:  back normal, gait/station normal, normal range of motion, non-

tender


Neurologic:  alert, oriented x3, responsive, motor strength/tone normal, 

sensory intact, normal gait, speech normal


Psychiatric:  judgement/insight normal, memory normal, mood/affect normal


Skin:  normal color, warm/dry, well hydrated, rash - multiple small 

erythematous papules that are discrete primarily on the dorsum of hands and 

wrist, and bilateral calfs and ankles, spares palms and soles,  few on the 

torso. no increased temperture to palpation.  no crusting or blisters, no oral 

lesions.


Lymphatic:  no adenopathy





Medical Decision Making


PA Attestation


Dr. Richardson is my supervising Physician whom patient management has been 

discussed with.


Diagnostic Impression:  


 Primary Impression:  


 Rash and other nonspecific skin eruption


ER Course


56 Yo Female presents to the ED c/O 8/10 in severity Burning and itching rash 

that consists of multiple insect bites, bilateral that was, hands and wrists x2 

weeks.  Patient states she was seen by dermatologist who prescribed antifungal 

topical and cortisone cream 6 days ago.  Patient states she has not had any 

relief with use of these medications.  Patient denies fevers or chills.  

Patient states she continues to have itching and burning pain in areas where 

lesions are.  Patient states she has bites all over her body.  Denies new 

medications, denies blisters, or sloughing of skin. Patient denies recent 

travel or ill contacts patient denies abdominal pain, nausea or vomiting. 

denies swelling of the lips, tongue or airway, denies SOB, wheezing or 

difficulty breathing. Denies CP, Palpitations, LOC, AMS, dizziness, Changes in 

Vision, Sensation, paresthesias, or a sudden severe headache.  





Ddx considered but are not limited to cellulitis, scabies, insect bites, tic 

bites, spider bites, contact dermatitis, Drug reaction, allergic reaction, 

fungal infection, lice. 





Vital signs: are WNL, pt. is afebrile


H&PE are most consistent with  rash with localized reactions, over a 

generalized area. no evidence of secondary infection. suspicious for 

infestation 


ORDERS: none required at this time, the diagnosis is clinical





ED INTERVENTIONS: None required at this time. 





- D/W pt. that will treat her symptoms as well as medication for possible 

infestation, suggested to pt. that she follow up with her dermatologist or at 

least contact them to let them know her prescribed medications did not help 

further evaluation may be necessary. 





d/w pt. that she is stable for outpatient follow up, however to return to ED 

with worsening or new symptoms. 





DISCHARGE: At this time pt. is stable for d/c to home. Will provide printed 

patient care instructions, and any necessary prescriptions. Care plan and 

follow up instructions have been discussed with the patient prior to discharge.





Last Vital Signs








  Date Time  Temp Pulse Resp B/P Pulse Ox O2 Delivery O2 Flow Rate FiO2


 


7/24/17 18:17 98.4 86 20 122/67 100 Room Air  








Disposition:  HOME, SELF-CARE


Condition:  Stable


Scripts


Permethrin* (ELIMITE*) 60 Gm Cream..g.


1 APPLIC TOPIC ONCE, #60 GM 0 Refills


   Apply cream from head to toe; leave on for 8-14 hours before washing


   off with


   water; may reapply in 1 week if live mites appear.


   Prov: Leigh Wyman         7/24/17 


Hydroxyzine HCl (Hydroxyzine HCl) 25 Mg Tablet


25 MG ORAL FOUR TIMES A DAY, #30 TAB


   Prov: Leigh Wyman         7/24/17


Patient Instructions:  Rash





Additional Instructions:  


Take medications as directed.  


 ** Follow up with a DERMATOLOGIST or  Primary Care Provider in 3-5 days, even 

if your symptoms have resolved. ** 


--Please review list of primary care clinics, if you do not already have a 

primary care provider





Return sooner to ED if new symptoms occur, or current symptoms become worse. 


Do not drink alcohol, drive, or operate heavy machinery while taking 

Hydroxyzine as this may cause drowsiness. 











- Please note that this Emergency Department Report was dictated using [employer] technology software, occasionally this can lead to 

erroneous entry secondary to interpretation by the dictation equipment.











Leigh Wyman Jul 24, 2017 18:39

## 2017-09-07 NOTE — EMERGENCY ROOM REPORT
History of Present Illness


General


Chief Complaint:  Dizziness


Source:  Patient, Medical Record





Present Illness


HPI


57-year-old female history of hypertension diabetes presenting with one month 

of lightheadedness.  Patient states that lightheadedness occurs intermittently 

throughout the whole month, but says that it is difficult for her to go to her 

doctor so she finally came today.  Patient states mild intermittent blurry 

vision, mild nausea, and also a slight nonproductive cough.  But denies any 

fever chills chest pain or shortness of breath.  Denies any dysuria.  He should 

has otherwise been able to ambulate on her own with her walker without any issue

, and has been eating and drinking normally.


Allergies:  


Coded Allergies:  


     MORPHINE (Verified  Allergy, Unknown, 2/22/16)





Patient History


Past Medical History:  see triage record


Past Surgical History:  none


Pertinent Family History:  none


Reviewed Nursing Documentation:  PMH: Agreed, PSxH: Agreed





Nursing Documentation-PMH


Past Medical History:  No History, Except For


Hx Cardiac Problems:  Yes


Hx Hypertension:  Yes


Hx Diabetes:  Yes


Hx Cancer:  Yes - Left breast ("Cancer free")


Hx Gastrointestinal Problems:  No


Hx Neurological Problems:  No





Review of Systems


All Other Systems:  negative except mentioned in HPI





Physical Exam





Vital Signs








  Date Time  Temp Pulse Resp B/P (MAP) Pulse Ox O2 Delivery O2 Flow Rate FiO2


 


9/7/17 10:26 97.5 94 18 133/63 99 Room Air  








Sp02 EP Interpretation:  reviewed, normal


General Appearance:  normal inspection, well appearing, no apparent distress, 

alert, GCS 15, non-toxic


Head:  normocephalic, atraumatic


Eyes:  bilateral eye normal inspection, bilateral eye PERRL, bilateral eye EOMI


ENT:  normal ENT inspection, normal pharynx, normal voice, TMs + canals normal, 

moist mucus membranes


Neck:  normal inspection, full range of motion, supple


Respiratory:  normal inspection, lungs clear, normal breath sounds, no 

respiratory distress, no retraction, no wheezing, speaking full sentences, 

chest symmetrical


Cardiovascular #1:  normal inspection, regular rate, rhythm, no edema, normal 

capillary refill


Cardiovascular #2:  2+ radial (R), 2+ radial (L)


Gastrointestinal:  normal inspection, non tender, soft, non-distended, no 

guarding


Musculoskeletal:  normal inspection, back normal, normal range of motion, non-

tender


Neurologic:  normal inspection, alert, oriented x3, responsive, CNs III-XII nml 

as tested, motor strength/tone normal, sensory intact, normal gait, speech 

normal


Psychiatric:  normal inspection, judgement/insight normal, memory normal


Skin:  normal inspection, normal color, no rash, warm/dry, well hydrated, 

normal turgor





Medical Decision Making


Diagnostic Impression:  


 Primary Impression:  


 Lightheadedness


ER Course


58 yo F with one month of lightheadedness, left ear pain, cough


DDX:


Chronic otitis media, electrolyte imbalance, dehydration, UTI, pneumonia, viral 

syndrome





Plan:


Obtain labs, ua, CXR, EKG


ER course:


Patient has remained stable during ED stay.


Has remained nontoxic, has been conversing on phone with a friend, not in 

respiratory distress, not in pain.





Disposition:


Patient is to be discharged to home.


Patient is instructed to followup with her primary care  within 5 days, also 

told to see an ENT specialist if symptoms continue


Strict return precautions discussed with patient such as fever, chills, 

worsening/severe pain, nausea, vomiting, which may indicate severe illness. 

Patient verbalizes understanding and agrees with plan. 





Please note that this Emergency Department Report was dictated using [employer] technology software, occasionally this can lead to 

erroneous entry secondary to interpretation by the dictation equipment





Laboratory Tests








Test


  9/7/17


11:10


 


White Blood Count


  4.3 K/UL


(4.8-10.8)  L


 


Red Blood Count


  4.46 M/UL


(4.20-5.40)


 


Hemoglobin


  13.5 G/DL


(12.0-16.0)


 


Hematocrit


  42.0 %


(37.0-47.0)


 


Mean Corpuscular Volume 94 FL (80-99)  


 


Mean Corpuscular Hemoglobin


  30.2 PG


(27.0-31.0)


 


Mean Corpuscular Hemoglobin


Concent 32.1 G/DL


(32.0-36.0)


 


Red Cell Distribution Width


  12.3 %


(11.6-14.8)


 


Platelet Count


  193 K/UL


(150-450)


 


Mean Platelet Volume


  9.7 FL


(6.5-10.1)


 


Neutrophils (%) (Auto)


  65.6 %


(45.0-75.0)


 


Lymphocytes (%) (Auto)


  27.3 %


(20.0-45.0)


 


Monocytes (%) (Auto)


  5.2 %


(1.0-10.0)


 


Eosinophils (%) (Auto)


  0.6 %


(0.0-3.0)


 


Basophils (%) (Auto)


  1.3 %


(0.0-2.0)


 


Urine Color Pale yellow  


 


Urine Appearance Clear  


 


Urine pH 7 (4.5-8.0)  


 


Urine Specific Gravity


  1.005


(1.005-1.035)


 


Urine Protein


  Negative


(NEGATIVE)


 


Urine Glucose (UA)


  4+ (NEGATIVE)


H


 


Urine Ketones


  Negative


(NEGATIVE)


 


Urine Occult Blood


  Negative


(NEGATIVE)


 


Urine Nitrite


  Negative


(NEGATIVE)


 


Urine Bilirubin


  Negative


(NEGATIVE)


 


Urine Urobilinogen


  Normal MG/DL


(0.0-1.0)


 


Urine Leukocyte Esterase


  Negative


(NEGATIVE)


 


Urine RBC


  0-2 /HPF (0 -


2)


 


Urine WBC


  2-4 /HPF (0 -


2)


 


Urine Squamous Epithelial


Cells Few /LPF


(NONE/OCC)


 


Urine Bacteria


  Few /HPF


(NONE)


 


Urine Yeast


  Few /HPF


(NONE)  H


 


Sodium Level


  141 mEQ/L


(135-145)


 


Potassium Level


  3.6 mEQ/L


(3.4-4.9)


 


Chloride Level


  108 mEQ/L


()  H


 


Carbon Dioxide Level


  22 mEQ/L


(20-30)


 


Anion Gap 11 (5-15)  


 


Blood Urea Nitrogen


  17 mg/dL


(7-23)


 


Creatinine


  1.3 mg/dL


(0.5-0.9)  H


 


Estimate Glomerular


Filtration Rate 51.1 mL/min


(>60)


 


Glucose Level


  223 mg/dL


()  H


 


Calcium Level


  9.5 mg/dL


(8.6-10.2)


 


Total Bilirubin


  0.3 mg/dL


(0.0-1.2)


 


Aspartate Amino Transferase


(AST) 28 U/L (5-40)  


 


 


Alanine Aminotransferase (ALT)


  78 U/L (3-33)


H


 


Alkaline Phosphatase


  119 U/L


()  H


 


Total Creatine Kinase


  117 U/L


()


 


Creatine Kinase MB


  2.6 ng/mL (<


3.8)


 


Creatine Kinase MB Relative


Index 2.2  


 


 


Troponin I


  < 0.30 ng/mL


(<=0.30)


 


Total Protein


  7.1 g/dL


(6.6-8.7)


 


Albumin


  4.4 g/dL


(3.5-5.2)


 


Globulin 2.7 g/dL  


 


Albumin/Globulin Ratio 1.6 (1.0-2.7)  








EKG Diagnostic Results


Rate:  normal


Rhythm:  NSR


ST Segments:  no acute changes


ASA given to the pt in ED:  No





Rhythm Strip Diag. Results


EP Interpretation:  yes


Rate:  82


Rhythm:  NSR, no PVC's, no ectopy





Chest X-Ray Diagnostic Results


Chest X-Ray Diagnostic Results :  


   Chest X-Ray Ordered:  Yes


   # of Views/Limited/Complete:  1 View


   Indication:  Other


   EP Interpretation:  Yes


   Interpretation:  no consolidation, no effusion, no pneumothorax, no acute 

cardiopulmonary disease


   Impression:  No acute disease


   Electronically Signed by:  Electronically signed by Jose Guadalupe Villalpando MD





Last Vital Signs








  Date Time  Temp Pulse Resp B/P (MAP) Pulse Ox O2 Delivery O2 Flow Rate FiO2


 


9/7/17 10:26 97.5 94 18 133/63 99 Room Air  

















Jose Guadalupe Villalpando M.D. Sep 7, 2017 11:09

## 2017-09-07 NOTE — DIAGNOSTIC IMAGING REPORT
Indication: SOB



Technique: One view of the chest



Comparison: 2/16/2017



Findings: Lungs and pleural spaces are clear. Heart size is normal. There are 

left

axillary surgical clips again demonstrated. No significant interim change



Impression: No acute process

## 2019-12-17 NOTE — EMERGENCY ROOM REPORT
History of Present Illness


General


Chief Complaint:  Pain


Source:  Patient, Medical Record





Present Illness


HPI


59-year-old female with history of hypertension, diabetes, breast cancer in 

remission comes to the ER with complaints of chronic left shoulder pain for 

over a year.  She reports she has a known rotator cuff impingement syndrome, 

and is trying to get out of her current HMO so that she can be referred to a 

specialist, she reports she has a primary care doctor and has tried meloxicam, 

Flexeril, NSAIDs, with minimal to no relief.  She reports that the pain has 

been worse over the past month, and that she has not tried any pain pills for 

other than those mentioned.  She requests just a few days of pain pills to help 

her with her pain since she was unable to sleep last night.  The pain is 

constant, severe, sharp, localized to the left shoulder, nonradiating, worse 

with movement, and patient denies any recent injuries, fevers, shortness of 

breath, chest pain, redness or warmth over the shoulder, any other complaints 

at all.


Allergies:  


Coded Allergies:  


     MORPHINE (Verified  Allergy, Unknown, 2/22/16)





Patient History


Past Medical History:  see triage record


Reviewed Nursing Documentation:  PMH: Agreed; PSxH: Agreed





Nursing Documentation-PMH


Past Medical History:  No History, Except For


Hx Cardiac Problems:  Yes


Hx Hypertension:  Yes


Hx Diabetes:  Yes


Hx Cancer:  Yes - Left breast ("Cancer free")


Hx Gastrointestinal Problems:  No


Hx Dialysis:  No


History Of Psychiatric Problem:  No


Hx Neurological Problems:  No





Review of Systems


Constitutional:  Denies: fever


Eye:  Denies: acuity changes


Respiratory:  Denies: cough, shortness of breath


Cardiovascular:  Denies: chest pain


Gastrointestinal:  Denies: nausea, vomiting


Skin:  Denies: rash


Neurological:  Denies: headache





Physical Exam





Vital Signs








  Date Time  Temp Pulse Resp B/P (MAP) Pulse Ox O2 Delivery O2 Flow Rate FiO2


 


12/17/19 08:01 98.1 88 16 139/77 (97) 100 Room Air  








General Appearance:  well appearing, no apparent distress


Head:  normocephalic, atraumatic


ENT:  hearing grossly normal, normal voice


Neck:  full range of motion, supple


Respiratory:  no respiratory distress, speaking full sentences


Cardiovascular #1:  normal peripheral pulses, no edema


Cardiovascular #2:  2+ radial (R), 2+ radial (L)


Gastrointestinal:  non tender, soft


Rectal:  deferred


Musculoskeletal:  normal inspection, moves extm spontaneously, decreased range 

of mation - Left shoulder with pain limited range of motion but able to range 

15 to 20 degrees of extension, flexion and abduction


Neurologic:  alert, motor strength/tone normal, sensory intact, normal gait


Psychiatric:  normal inspection, judgement/insight normal, memory normal, mood/

affect normal


Skin:  no rash





Medical Decision Making


Diagnostic Impression:  


 Primary Impression:  


 Pain


ER Course


I have reviewed the [employer] prescription drug monitoring database, and the most 

recent activity patient had was on July 23 of 2019, which was well over 4 

months ago and she was given a 30-day supply of fentanyl transdermal system, 

and before that 1 month prior in June she was given 2 separate Norco 

prescriptions for a 10 and 15-day supply respectively.  There have been no 

recent prescriptions in the past 4 months.





Differential diagnosis includes malingering, drug-seeking behavior, rotator 

cuff impingement syndrome.





Doubt fracture, dislocation, septic arthritis, also doubt atypical acute 

coronary syndrome, will discharge patient, given 1 dose of Norco here, will 

give 5 tablets of Norco 5/325 and Motrin 600 mg upon discharge.





Last Vital Signs








  Date Time  Temp Pulse Resp B/P (MAP) Pulse Ox O2 Delivery O2 Flow Rate FiO2


 


12/17/19 08:11 98.1  16 139/77 100 Room Air  


 


12/17/19 08:01  88      








Disposition:  HOME, SELF-CARE


Condition:  Stable


Scripts


Ibuprofen* (MOTRIN*) 600 Mg Tablet


600 MG ORAL Q8H PRN for For Pain, #15 TAB 0 Refills


   Prov: ENRIQUE CALLOWAY M.D         12/17/19


Patient Instructions:  Rotator Cuff Tendinitis











ENRIQUE CALLOWAY M.D Dec 17, 2019 08:27

## 2022-07-23 NOTE — DISCHARGE SUMMARY
Discharge Summary


Hospital Course


Date of Admission


Feb 16, 2017 at 01:00


Date of Discharge





Admitting Diagnosis


Acute coronary syndrome


HPI


Mona Freitas is a 56 year old female who was admitted on Feb 16, 2017 

at 01:00 for Acute Coronary Syndrome


Hospital Course


Dictated for Int Travis-Dr Walker no. [id number].





Discharge


Discharge Disposition


Patient was discharged to Home (01)


Discharge Diagnoses:  











ED KLEIN Feb 20, 2017 11:38 0

## 2023-12-26 NOTE — CARDIOLOGY REPORT
--------------- APPROVED REPORT --------------





EKG Measurement

Heart [id number]

MI [id number]

[id number]

[id number]

[id number]





Sinus tachycardia

Nonspecific T wave abnormality

Abnormal ECG Goal Outcome Evaluation:      Plan of Care Reviewed With: patient    Overall Patient Progress: no changeOverall Patient Progress: no change     Care from [id number]  Inpatient Progress Note    ORIENTATION: Aox4. Opens eyes to voice, forgetfulness, ensure pt is alert when swallowing.   VSS  PAIN: Pt endorsed some chronic pain in her back/feet, scheduled pain medication administered.  O2: RA  GI/: [place] in place.  ACTIVITY: Not OOB this shift.   DIET: Regular. Hard feeding--due to pt's weak/shaky hands, requires assistance. Ensure pt is alert when attempting to feed. Low appetite. Did not eat at dinner time and had one bite later in the evening.   LINES/DRAINS: R PIV infusing NS @75ml/hr  PROTOCOLS: K, Mg, P. Rechecks in for this morning at 0600.  CIWAS: 2s this shift, no PRNs required to administer per parameters.  PLAN: Pt following. IV depacon 2x a day. Discharge TBD.